# Patient Record
Sex: MALE | Race: BLACK OR AFRICAN AMERICAN | NOT HISPANIC OR LATINO | Employment: FULL TIME | URBAN - METROPOLITAN AREA
[De-identification: names, ages, dates, MRNs, and addresses within clinical notes are randomized per-mention and may not be internally consistent; named-entity substitution may affect disease eponyms.]

---

## 2017-01-16 ENCOUNTER — CLINICAL SUPPORT (OUTPATIENT)
Dept: REHABILITATION | Facility: HOSPITAL | Age: 37
End: 2017-01-16
Attending: ORTHOPAEDIC SURGERY
Payer: COMMERCIAL

## 2017-01-16 DIAGNOSIS — M76.60 ACHILLES TENDON PAIN: Primary | ICD-10-CM

## 2017-01-16 PROCEDURE — 97110 THERAPEUTIC EXERCISES: CPT | Performed by: PHYSICAL THERAPIST

## 2017-01-16 PROCEDURE — 97161 PT EVAL LOW COMPLEX 20 MIN: CPT | Performed by: PHYSICAL THERAPIST

## 2017-01-16 NOTE — PROGRESS NOTES
[facility]   PATIENT EVALUATION    Date: 01/16/2017  Start Time: 5:30  Stop Time: 6:30    Patient Name: Anjelica Vizcaino  Clinic Number: [id number]  Age: 36 y.o.  Gender: male    Diagnosis: OPERATIVE PROCEDURE PERFORMED:  1. Right complex Achilles tendon repair.  2. Right placental graft application  Encounter Diagnosis   Name Primary?    Achilles tendon pain Yes       Referring Physician: Rex Lange*  Treatment Orders: PT Eval and Treat      History     No past medical history on file.    Current Outpatient Prescriptions   Medication Sig    aspirin 325 MG tablet Take 1 tablet (325 mg total) by mouth once daily.    ibuprofen (ADVIL,MOTRIN) 200 MG tablet Take 200 mg by mouth every 6 (six) hours as needed for Pain.    oxycodone-acetaminophen (PERCOCET)  mg per tablet Take 1 tablet by mouth every 4 (four) hours as needed for Pain.    promethazine (PHENERGAN) 25 MG tablet Take 1 tablet (25 mg total) by mouth every 4 (four) hours.     No current facility-administered medications for this visit.        Review of patient's allergies indicates:  No Known Allergies      Subjective     History of Present Condition: Pt reports tearing the right Achilles on Thanksgiving day while playing backyard basketball. Pt reports hearing a pop and got some ice followed by Ibuprofen. Pt followed up with Dr. Bai for surgery.    Onset Date: 11/24/16  Date of Surgery: 12/8/16  Precautions: WEIGHTBEARING STATUS: Nonweightbearing for 6 weeks, partial weightbearing at   6-7 weeks and full weightbearing by 7-8 weeks with fracture walking boot in   place with ambulation at 6-7 and 8 weeks' time.     Following 6 weeks to 8 weeks protection, the patient could be advanced to a   standard Achilles tendon repair protocol, but should avoid high impact type   loads approximately 4-6 months following surgery.    Mechanism of Injury: sudden    Pain Location: right ankle pain  Pain Description:  "Aching, Dull, Burning, Throbbing, Sharp and Shooting  Current Pain: 0/10  Least Pain: 0/10  Worst Pain: 3/10  Aggravating Factors: touch, scraping, boot wear  Relieving Factors:rest      Prior Therapy: none    Occupation: , [employer]      Sports/Recreational Activities: basketball, coaching  Extremity Dominance: R    Prior Level of Function: Independent  Functional Deficits Leading to Referral/Nature of Injury: none  Patient Therapy Goals: To get back to normal function, like it never happened"  Cultural/Environmental/Spiritual Barriers to Treatment or Learning: none      Objective     Observation: pt with open wound at proximal incision - Enrico Walden wound care specialist consulted for appropriate dressing.   Gait: amb into clinic NWB on RLE with boot.      Palpation: TTP along distal gastroc incision.    Range of Motion   Left ankle:  PF: 45  DF: 10  IV: 36  EV: 12    Right ankle:  PF: 20  DF: 5  IV: 15  EV: 10    Joint Mobility: hypomobile right talocrural grossly tested.    Strength   Left ankle:  PF: 5/5  DF: 5/5  IV: 5/5  EV: 5/5    Right ankle:  PF: NT  DF: NT  IV: NT  EV: NT      Special Tests: NT post op        Treatment:   - quad set with strap 5 sec hold x 20  - ABCs x 1   - SLR flexion 3x10  - SL hip abd 3x10        Functional Limitations Reports - G Codes  Category: Mobility  Tool: FOTO  Score: 28       History  Co-morbidities and personal factors that may impact the plan of care Low    Stable Clinical Presentation   Co-morbidities:       Personal Factors:     Body regions    Body systems    Activity Limitations    Participation Restrictons   No personal factors and/ or  comorbidites          Address 1-2 elements:             Assessment     This is a 36 y.o. male referred to outpatient physical therapy and presents with a medical diagnosis of right ankle pain and demonstrates limitations as described in the problem list. Pt demonstrates good rehab potential. Pt will benefit from physcial " therapy services in order to maximize pain free and/or functional use of right LE. The following goals were discussed with the patient and patient is in agreement with them as to be addressed in the treatment plan. Pt was given a HEP consisting of functional ankle ROM therex and hip PREs. Pt verbally understood the instructions as they were given and demonstrated proper form and technique during therapy. Pt was advised to perform these exercises free of pain, and to stop performing them if pain occurs.     Medical necessity is demonstrated by the following problem list:   - Pain limits function of effected part for all activities  - Unable to participate in daily activities   - Requires skilled supervision to complete and progress HEP  - Fall risk - impaired balance   - Continued inability to participate in vocational pursuits          Short Term Goals (9  Weeks):  - Pt will increase ROM of right ankle = left.  - Pt will increase strength of bilateral hip abd = 5/5  - Decrease Pain to 1/10 with ADLs  - Pt to self correct posture independently with normalized gait pattern.   - Pt independent with HEP with progressions.     Long Term Goals (18 Weeks):  - Pt with normalized SLS mechanics x 2 min  - Pt with 94% Ybalance scores  - Decrease Pain to 0/10 with jogging, boxing  - Pt to return to boxing without pain.      Plan     Pt will be treated by physical therapy 2  times a week for 18 weeks for manual therapy, therapeutic exercise, home exercise program, patient education, and modalities PRN to achieve established goals. Anjelica CANTRELL may at times be seen by a PTA as part of the Rehab Team.     Therapist: ALESIA SMALLWOOD, PT    I CERTIFY THE NEED FOR THESE SERVICES FURNISHED UNDER THIS PLAN OF TREATMENT AND WHILE UNDER MY CARE    Physician's comments: ____________________________________________________________________________________________________________________________________________    Physician's Name:  ___________________________________

## 2017-01-18 ENCOUNTER — OFFICE VISIT (OUTPATIENT)
Dept: SPORTS MEDICINE | Facility: CLINIC | Age: 37
End: 2017-01-18
Payer: COMMERCIAL

## 2017-01-18 VITALS
BODY MASS INDEX: 40.18 KG/M2 | HEART RATE: 101 BPM | SYSTOLIC BLOOD PRESSURE: 132 MMHG | HEIGHT: 66 IN | DIASTOLIC BLOOD PRESSURE: 83 MMHG | WEIGHT: 250 LBS

## 2017-01-18 DIAGNOSIS — S81.801A LEG WOUND, RIGHT, INITIAL ENCOUNTER: ICD-10-CM

## 2017-01-18 DIAGNOSIS — T81.30XA WOUND DEHISCENCE: ICD-10-CM

## 2017-01-18 DIAGNOSIS — S86.011D RUPTURE OF RIGHT ACHILLES TENDON, SUBSEQUENT ENCOUNTER: Primary | ICD-10-CM

## 2017-01-18 PROCEDURE — 99024 POSTOP FOLLOW-UP VISIT: CPT | Mod: S$GLB,,, | Performed by: ORTHOPAEDIC SURGERY

## 2017-01-18 PROCEDURE — 87075 CULTR BACTERIA EXCEPT BLOOD: CPT

## 2017-01-18 PROCEDURE — 87070 CULTURE OTHR SPECIMN AEROBIC: CPT

## 2017-01-18 PROCEDURE — 87077 CULTURE AEROBIC IDENTIFY: CPT

## 2017-01-18 PROCEDURE — 87186 SC STD MICRODIL/AGAR DIL: CPT

## 2017-01-18 PROCEDURE — 99999 PR PBB SHADOW E&M-EST. PATIENT-LVL III: CPT | Mod: PBBFAC,,, | Performed by: ORTHOPAEDIC SURGERY

## 2017-01-18 RX ORDER — SULFAMETHOXAZOLE AND TRIMETHOPRIM 800; 160 MG/1; MG/1
1 TABLET ORAL 2 TIMES DAILY
Qty: 30 TABLET | Refills: 0 | Status: SHIPPED | OUTPATIENT
Start: 2017-01-18 | End: 2017-01-28

## 2017-01-18 NOTE — PROGRESS NOTES
Subjective:          Chief Complaint: Anjelica Vizcaino is a 36 y.o. male who had concerns including Post-op Evaluation of the Right Ankle.    HPI Comments: 37 yo male patient s/p below; he is currently in therapy at [facility] with Jamaal Bills ; patient states that the wound opened up a 2.5 weeks ago; he states that is has not been draining       DATE OF PROCEDURE: 12/08/2016     ATTENDING SURGEON: Princess Bai M.D.     FIRST ASSISTANT: Rex Lange MD - fellow.     SECOND ASSISTANT: SMA Alfreda - assistant.     PREOPERATIVE DIAGNOSIS: Right Achilles tendon tear.     POSTOPERATIVE DIAGNOSIS: Right Achilles tendon tear (complex high-grade   interstitial).     OPERATIVE PROCEDURE PERFORMED:  1. Right complex Achilles tendon repair.  2. Right placental graft application. CPT code 09611, reference CPT code   90536.    Handedness: right-handed  Sport played:    Level:            Ankle Pain    This is a new problem. The current episode started in the past 7 days. There has been a history of trauma. Pertinent negatives include no fever, numbness or stiffness. There is no history of gout.   Pain   Pertinent negatives include no chest pain, chills, diaphoresis, fever, joint swelling, myalgias, nausea, numbness, rash, vomiting or weakness.       Review of Systems   Constitution: Negative for chills, decreased appetite, diaphoresis, fever, weakness, malaise/fatigue, night sweats, weight gain and weight loss.   HENT: Negative for hearing loss.    Eyes: Negative for blurred vision, double vision, pain and visual disturbance.   Cardiovascular: Negative for chest pain, cyanosis, dyspnea on exertion, leg swelling, orthopnea and palpitations.   Respiratory: Negative for hemoptysis, shortness of breath and wheezing.    Endocrine: Negative for polyuria.   Hematologic/Lymphatic: Negative for bleeding problem.   Skin: Negative for color change and rash.   Musculoskeletal: Positive for joint pain. Negative for arthritis, back  pain, falls, gout, joint swelling, muscle cramps, muscle weakness, myalgias and stiffness.   Gastrointestinal: Negative for hematemesis, hematochezia, melena, nausea and vomiting.   Genitourinary: Negative for dysuria, frequency and hematuria.   Neurological: Negative for dizziness, light-headedness, loss of balance, numbness and paresthesias.   Psychiatric/Behavioral: Negative for altered mental status, depression and memory loss. The patient does not have insomnia and is not nervous/anxious.        Pain Related Questions  Over the past 3 days, what was your average pain during activity? (I.e. running, jogging, walking, climbing stairs, getting dressed, ect.): 2  Over the past 3 days, what was your highest pain level?: 2  Over the past 3 days, what was your lowest pain level? : 0    Other  How many nights a week are you awakened by your affected body part?: 0  Was the patient's HEIGHT measured or patient reported?: Patient Reported  Was the patient's WEIGHT measured or patient reported?: Patient Reported      Objective:        General: Anjelica CANTRELL is well-developed, well-nourished, appears stated age, in no acute distress, alert and oriented to time, place and person.     General    Vitals reviewed.  Constitutional: He is oriented to person, place, and time. He appears well-developed and well-nourished. No distress.   HENT:   Mouth/Throat: No oropharyngeal exudate.   Eyes: Right eye exhibits no discharge. Left eye exhibits no discharge.   Neck: Normal range of motion.   Cardiovascular: Normal rate.    Pulmonary/Chest: Breath sounds normal. No respiratory distress.   Neurological: He is alert and oriented to person, place, and time. He has normal reflexes. No cranial nerve deficit. Coordination normal.   Psychiatric: He has a normal mood and affect. His behavior is normal. Judgment and thought content normal.     General Musculoskeletal Exam   Gait: normal     Right Ankle/Foot Exam     Inspection   Scars:  present  Deformity: absent  Erythema: absent  Bruising: Ankle - absent Foot - absent  Effusion: Ankle - absent Foot - absent  Atrophy: Ankle - absent Foot - absent    Tenderness   The patient is tender to palpation of the Achilles tendon.    Range of Motion   Ankle Joint   Dorsiflexion:  10 normal   Plantar flexion:  35 normal   Subtalar Joint   Inversion:  15 normal   Eversion:  5 normal   Burrows Test:  positive  First MTP Joint: normal    Alignment   Knee Alignment: neutral  Hindfoot Alignment: neutral  Midfoot Alignment: normal  Forefoot Alignment: normal    Tests   Anterior drawer: 1+  Varus tilt: 1+  Heel Walk: able to perform  Tiptoe Walk: unable to perform  Single Heel Rise: unable to perform  External Rotation Test: negative  Squeeze Test: negative    Other   Ankle Crepitus: absent  Foot Crepitus:  absent  Sensation: normal  Peroneal Subluxation: negative    Comments:  Wound; opening at superior aspect of the wound;     Left Ankle/Foot Exam     Inspection  Deformity: absent  Erythema: absent  Bruising: Ankle - absent Foot - absent  Effusion: Ankle - absent Foot - absent  Atrophy: Ankle - absent Foot - absent  Scars: absent    Range of Motion   Ankle Joint  Dorsiflexion:  10 normal   Plantar flexion:  35 normal     Subtalar Joint   Inversion:  15 normal   Eversion:  5 normal   Burrows Test:  normal  First MTP Joint: normal    Alignment   Knee Alignment: neutral  Hindfoot Alignment: neutral  Midfoot Alignment: normal  Forefoot Alignment: normal    Tests   Anterior drawer: 1+  Varus tilt: 1+  Heel Walk: able to perform  Tiptoe Walk: able to perform  Single Heel Rise: able to perform  External Rotation Test: negative  Squeeze Test: absent    Other   Foot Crepitus:  absent  Ankle Crepitus: absent  Sensation: normal  Peroneal Subluxation: negative      Muscle Strength   Right Lower Extremity   Anterior tibial:  5/5/5  Posterior tibial:  5/5/5  Gastrocsoleus:  4/5/5  Peroneal muscle:  5/5/5  EHL:  5/5  FDL:  5/5  EDL: 5/5  FHL: 5/5  Left Lower Extremity   Anterior tibial:  5/5/5   Posterior tibial:  5/5/5  Gastrocsoleus:  5/5/5  Peroneal muscle:  5/5/5  EHL:  5/5  FDL: 5/5  EDL: 5/5  FHL: 5/5    Reflexes     Left Side  Post Tibial:  2+  Achilles:  2+  Plantar Reflex: 2+    Right Side   Post Tibial:  2+  Achilles:  2+  Plantar Reflex: 2+    Vascular Exam     Right Pulses  Dorsalis Pedis:      2+  Posterior Tibial:      2+        Left Pulses  Dorsalis Pedis:      2+  Posterior Tibial:      2+        Edema  Right Lower Leg: absent  Left Lower Leg: absent      MRI RESULTS  TENDONS: There is severe tendinosis of almost the entire length of the Achilles tendon that spares the distal insertional fibers and is most severe just distal to the myotendinous junction noting a 5 cm segment of significant friability and pronounced peritendinitis with high-grade, near-complete interstitial tearing.  Posterior tibial, flexor hallucis longus, flexor digitorum longus, peroneus longus and peroneus brevis are intact.    LIGAMENTS: There is attenuation of the anterior talofibular ligament suggesting chronic sprain.  Calcaneofibular, posterior talofibular, anterior inferior tibiofibular, posterior inferior tibiofibular, interosseous membrane and Lisfranc ligaments are intact.     MUSCLES:  Normal bulk and signal.  No accessory muscles.    BONES: There is a small os trigonum with mild osseous edema about the synchondrosis and trace fluid within the posterior subtalar recess.  No fractures.  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  No marrow signal abnormality to suggest an infiltrative process.    CARTILAGE:  Tibiotalar, subtalar and mid foot joint spaces are well-maintained.    MISC:  Plantar fascia and sinus tarsi are normal. Tarsal tunnel is  unremarkable.        Assessment:       Encounter Diagnoses   Name Primary?    Rupture of right Achilles tendon, subsequent encounter Yes    Wound dehiscence     Leg wound, right, initial encounter           Plan:       1. Ankle/foot function, SF-12 was not filled out today in clinic.     RTC in 1 weeks. Patient will not fill out Ankle/foot function, SF-12 on return.    2. Enrico Walden wound consult    3. Abx; Bactrim     4. Continue Pt per protocol; may begin to partial weightbearing    5. Applied foam and mepilex dressing

## 2017-01-18 NOTE — PATIENT INSTRUCTIONS
WEIGHTBEARING STATUS: Nonweightbearing for 6 weeks, partial weightbearing at   6-7 weeks and full weightbearing by 7-8 weeks with fracture walking boot in   place with ambulation at 6-7 and 8 weeks' time.     Following 6 weeks to 8 weeks protection, the patient could be advanced to a   standard Achilles tendon repair protocol, but should avoid high impact type   loads approximately 4-6 months following surgery.

## 2017-01-18 NOTE — MR AVS SNAPSHOT
[facility]  [address]  [facility] [address]  Phone: [phone]                  Anjelica Vizcaino   2017 2:15 PM   Office Visit    Description:  Male : 1980   Provider:  Princess Bai MD   Department:  [facility]           Reason for Visit     Right Ankle - Post-op Evaluation           Diagnoses this Visit        Comments    Rupture of right Achilles tendon, subsequent encounter    -  Primary     Wound dehiscence         Leg wound, right, initial encounter                To Do List           Future Appointments        Provider Department Dept Phone    2017 9:30 AM Jamaal Bills, PT [facility] [phone]    2017 4:30 PM Jamaal Bills PT [facility] [phone]    2017 9:30 AM Jamaal Bills PT [facility] [phone]    2017 9:30 AM Jamaal Bills PT [facility] [phone]    2017 9:30 AM Jamaal Bills, PT [facility] [phone]      Goals (5 Years of Data)     None      Follow-Up and Disposition     Return in about 1 week (around 2017), or RTC in 1 weeks. Patient will not fill out Ankle/foot function, SF-12 on return., for RTC in 1 weeks. Patient will not fill out Ankle/foot function, SF-12 on return..       These Medications        Disp Refills Start End    sulfamethoxazole-trimethoprim 800-160mg (BACTRIM DS) 800-160 mg Tab 30 tablet 0 2017    Take 1 tablet by mouth 2 (two) times daily. - Oral    Pharmacy: [facility]/pharmacy #[id number] - [place] [place] [address] #: [phone]         [facility]     [facility] On Call Nurse Care Line -  Assistance  Registered nurses in the [facility] provide clinical advisement, health education, appointment booking, and other advisory services.  Call for this free service at [phone].             Medications          "  Message regarding Medications     Verify the changes and/or additions to your medication regime listed below are the same as discussed with your clinician today.  If any of these changes or additions are incorrect, please notify your healthcare provider.        START taking these NEW medications        Refills    sulfamethoxazole-trimethoprim 800-160mg (BACTRIM DS) 800-160 mg Tab 0    Sig: Take 1 tablet by mouth 2 (two) times daily.    Class: Normal    Route: Oral           Verify that the below list of medications is an accurate representation of the medications you are currently taking.  If none reported, the list may be blank. If incorrect, please contact your healthcare provider. Carry this list with you in case of emergency.           Current Medications     aspirin 325 MG tablet Take 1 tablet (325 mg total) by mouth once daily.    ibuprofen (ADVIL,MOTRIN) 200 MG tablet Take 200 mg by mouth every 6 (six) hours as needed for Pain.    oxycodone-acetaminophen (PERCOCET)  mg per tablet Take 1 tablet by mouth every 4 (four) hours as needed for Pain.    promethazine (PHENERGAN) 25 MG tablet Take 1 tablet (25 mg total) by mouth every 4 (four) hours.    sulfamethoxazole-trimethoprim 800-160mg (BACTRIM DS) 800-160 mg Tab Take 1 tablet by mouth 2 (two) times daily.           Clinical Reference Information           Vital Signs - Last Recorded  Most recent update: 1/18/2017  2:42 PM by Jossy Claudio MA    BP Pulse Ht Wt BMI    132/83 101 5' 6" (1.676 m) 113.4 kg (250 lb) 40.35 kg/m2      Blood Pressure          Most Recent Value    BP  132/83      Allergies as of 1/18/2017     No Known Allergies      Immunizations Administered on Date of Encounter - 1/18/2017     None      Orders Placed During Today's Visit      Normal Orders This Visit    Ambulatory Referral to Physical/Occupational Therapy       Instructions    WEIGHTBEARING STATUS: Nonweightbearing for 6 weeks, partial weightbearing at   6-7 weeks and " full weightbearing by 7-8 weeks with fracture walking boot in   place with ambulation at 6-7 and 8 weeks' time.     Following 6 weeks to 8 weeks protection, the patient could be advanced to a   standard Achilles tendon repair protocol, but should avoid high impact type   loads approximately 4-6 months following surgery.

## 2017-01-23 ENCOUNTER — CLINICAL SUPPORT (OUTPATIENT)
Dept: REHABILITATION | Facility: HOSPITAL | Age: 37
End: 2017-01-23
Attending: ORTHOPAEDIC SURGERY
Payer: COMMERCIAL

## 2017-01-23 DIAGNOSIS — M25.571 ACUTE RIGHT ANKLE PAIN: Primary | ICD-10-CM

## 2017-01-23 LAB
BACTERIA SPEC AEROBE CULT: NORMAL
BACTERIA SPEC ANAEROBE CULT: NORMAL

## 2017-01-23 PROCEDURE — 97140 MANUAL THERAPY 1/> REGIONS: CPT | Performed by: PHYSICAL THERAPIST

## 2017-01-23 PROCEDURE — 97110 THERAPEUTIC EXERCISES: CPT | Performed by: PHYSICAL THERAPIST

## 2017-01-23 NOTE — PROGRESS NOTES
Physical Therapy Daily Note     Date: 01/23/2017  Name: Anjelica Vizcaino  [facility] Number: [id number]  Diagnosis:   Encounter Diagnosis   Name Primary?    Acute right ankle pain Yes     Diagnosis: OPERATIVE PROCEDURE PERFORMED:  1. Right complex Achilles tendon repair.  2. Right placental graft application    Physician: Rex Lange*    Evaluation Date: 1/16/17  Date of Surgery: 12/8/16  Visit #: 2   Start Time:  9:30  Stop Time:  10:30  Total Treatment Time: 60    Precautions: WEIGHTBEARING STATUS: Nonweightbearing for 6 weeks, partial weightbearing at   6-7 weeks and full weightbearing by 7-8 weeks with fracture walking boot in   place with ambulation at 6-7 and 8 weeks' time.      Following 6 weeks to 8 weeks protection, the patient could be advanced to a   standard Achilles tendon repair protocol, but should avoid high impact type   loads approximately 4-6 months following surgery.    Subjective     Pt reports the ankle is feeling better. The wound seems to be healing up. It is moving better.     Pain: 1/10    Objective     Measurements taken: none    Patient received individual therapy to increase strength, endurance and ROM with activities as follows:     Anjelica CANTRELL received therapeutic exercises to develop strength, endurance and ROM for 25 minutes including:   - ankle ABCs x 2 laps  - BAPs board fwd, bwd, side,side x 2 min ea  - towel scrunches x 5 min  - clamshells 3x10  - SLR 3x10   - bridges 3x10  - Shuttle DL 3c 3x10  - Shuttle SL 1c 2x10      Anjelica CANTRELL received the following manual therapy techniques: Joint mobilizations and Soft tissue Mobilization were applied to the: right ankle for 15 minutes.   - PROM: all  - posterior talus mobs      Written Home Exercises Provided: updated as per therex list  Pt demo good understanding of the education provided. Anjelica CANTRELL demonstrated good return demonstration of activities.     Education provided:  Anjelica CANTRELL  verbalized good understanding of education provided.   No spiritual or educational barriers to learning identified.    Assessment     Excellent tolerance to intial PWB therex along with improving right ankle functional mobility. Fatigued with hip progressions. Continue to progress gently as tolerated with PWB as per Dr. Bai.    This is a 36 y.o. male referred to outpatient physical therapy and presents with a medical diagnosis of right ankle pain and demonstrates limitations as described in the problem list Pt prognosis is Good. Pt will continue to benefit from skilled outpatient physical therapy to address the deficits listed below in the problem list, provide pt/family education and to maximize pt's level of independence in the home and community environment.    Will the patient continue to benefit from skilled PT intervention? yes        Medical necessity is demonstrated by:   - Pain limits function of effected part for all activities  - Unable to participate in daily activities   - Requires skilled supervision to complete and progress HEP  - Fall risk - impaired balance   - Continued inability to participate in vocational pursuits    Short Term Goals (9  Weeks):  - Pt will increase ROM of right ankle = left.  - Pt will increase strength of bilateral hip abd = 5/5  - Decrease Pain to 1/10 with ADLs  - Pt to self correct posture independently with normalized gait pattern.   - Pt independent with HEP with progressions.      Long Term Goals (18 Weeks):  - Pt with normalized SLS mechanics x 2 min  - Pt with 94% Ybalance scores  - Decrease Pain to 0/10 with jogging, boxing  - Pt to return to boxing without pain.       Plan   Continue with established Plan of Care towards PT goals.      Therapist: ALESIA SMALLWOOD, PT

## 2017-01-25 ENCOUNTER — OFFICE VISIT (OUTPATIENT)
Dept: SPORTS MEDICINE | Facility: CLINIC | Age: 37
End: 2017-01-25
Payer: COMMERCIAL

## 2017-01-25 ENCOUNTER — CLINICAL SUPPORT (OUTPATIENT)
Dept: REHABILITATION | Facility: HOSPITAL | Age: 37
End: 2017-01-25
Attending: ORTHOPAEDIC SURGERY
Payer: COMMERCIAL

## 2017-01-25 VITALS
HEART RATE: 109 BPM | SYSTOLIC BLOOD PRESSURE: 128 MMHG | WEIGHT: 250 LBS | BODY MASS INDEX: 40.18 KG/M2 | DIASTOLIC BLOOD PRESSURE: 83 MMHG | HEIGHT: 66 IN

## 2017-01-25 DIAGNOSIS — T81.30XA WOUND DEHISCENCE: Primary | ICD-10-CM

## 2017-01-25 DIAGNOSIS — S86.011S ACHILLES TENDON RUPTURE, RIGHT, SEQUELA: ICD-10-CM

## 2017-01-25 DIAGNOSIS — S81.801A LEG WOUND, RIGHT, INITIAL ENCOUNTER: ICD-10-CM

## 2017-01-25 DIAGNOSIS — S86.011D RUPTURE OF RIGHT ACHILLES TENDON, SUBSEQUENT ENCOUNTER: ICD-10-CM

## 2017-01-25 DIAGNOSIS — M25.571 ACUTE RIGHT ANKLE PAIN: Primary | ICD-10-CM

## 2017-01-25 PROCEDURE — 99024 POSTOP FOLLOW-UP VISIT: CPT | Mod: S$GLB,,, | Performed by: ORTHOPAEDIC SURGERY

## 2017-01-25 PROCEDURE — 97140 MANUAL THERAPY 1/> REGIONS: CPT | Performed by: PHYSICAL THERAPIST

## 2017-01-25 PROCEDURE — 99999 PR PBB SHADOW E&M-EST. PATIENT-LVL III: CPT | Mod: PBBFAC,,, | Performed by: ORTHOPAEDIC SURGERY

## 2017-01-25 PROCEDURE — 97110 THERAPEUTIC EXERCISES: CPT | Performed by: PHYSICAL THERAPIST

## 2017-01-25 RX ORDER — SULFAMETHOXAZOLE AND TRIMETHOPRIM 800; 160 MG/1; MG/1
1 TABLET ORAL 2 TIMES DAILY
Qty: 30 TABLET | Refills: 0 | Status: CANCELLED | OUTPATIENT
Start: 2017-01-25 | End: 2017-02-04

## 2017-01-25 NOTE — MR AVS SNAPSHOT
[facility]  [address]  [facility] [address]  Phone: [phone]                  Anjelica Vizcaino   2017 2:30 PM   Office Visit    Description:  Male : 1980   Provider:  Princess Bai MD   Department:  [facility]           Reason for Visit     Right Ankle - Post-op Evaluation           Diagnoses this Visit        Comments    Wound dehiscence    -  Primary     Rupture of right Achilles tendon, subsequent encounter         Leg wound, right, initial encounter         BMI 39.0-39.9,adult         Achilles tendon rupture, right, sequela                To Do List           Future Appointments        Provider Department Dept Phone    2017 4:30 PM Jamaal Bills, PT [facility] [phone]    2017 9:30 AM Jamaal Bills PT [facility] [phone]    2017 9:30 AM Jamaal Bills PT [facility] [phone]    2017 9:30 AM Jamaal Bills PT [facility] [phone]    2017 9:30 AM Jamaal Bills, PT [facility] [phone]      Goals (5 Years of Data)     None      Follow-Up and Disposition     Return in about 1 week (around 2017) for RTC in 1 weeks with Ruthy Lynn PA-C. Patient will not fill out Ankle/foot function, SF-12 on re.      [facility]     [facility] -  Assistance  Registered nurses in the [facility] provide clinical advisement, health education, appointment booking, and other advisory services.  Call for this free service at [phone].             Medications           Message regarding Medications     Verify the changes and/or additions to your medication regime listed below are the same as discussed with your clinician today.  If any of these changes or additions are incorrect, please notify your healthcare provider.             Verify that the below list of  "medications is an accurate representation of the medications you are currently taking.  If none reported, the list may be blank. If incorrect, please contact your healthcare provider. Carry this list with you in case of emergency.           Current Medications     aspirin 325 MG tablet Take 1 tablet (325 mg total) by mouth once daily.    ibuprofen (ADVIL,MOTRIN) 200 MG tablet Take 200 mg by mouth every 6 (six) hours as needed for Pain.    oxycodone-acetaminophen (PERCOCET)  mg per tablet Take 1 tablet by mouth every 4 (four) hours as needed for Pain.    promethazine (PHENERGAN) 25 MG tablet Take 1 tablet (25 mg total) by mouth every 4 (four) hours.    sulfamethoxazole-trimethoprim 800-160mg (BACTRIM DS) 800-160 mg Tab Take 1 tablet by mouth 2 (two) times daily.           Clinical Reference Information           Vital Signs - Last Recorded  Most recent update: 1/25/2017  2:27 PM by Nya Farrell MA    BP Pulse Ht Wt BMI    128/83 109 5' 6" (1.676 m) 113.4 kg (250 lb) 40.35 kg/m2      Blood Pressure          Most Recent Value    BP  128/83      Allergies as of 1/25/2017     No Known Allergies      Immunizations Administered on Date of Encounter - 1/25/2017     None      Instructions    CAST CHANGE: The patient should be scheduled for a cast change to occur 8 days   following surgery, switching out of the postoperative splint and into a   plantarflexion cast (short-leg). Cast will maintain for an additional 2 weeks   for a total of 3 weeks postoperatively. The patient will then taken out of the   cast and placed to a fracture walker boot with 2 heel cups at that time (3   weeks). The patient was maintained in the fracture walker boot for an   additional 3 weeks.     WEIGHTBEARING STATUS: Nonweightbearing for 6 weeks, partial weightbearing at   6-7 weeks and full weightbearing by 7-8 weeks with fracture walking boot in   place with ambulation at 6-7 and 8 weeks' time.     Following 6 weeks to 8 weeks " protection, the patient could be advanced to a   standard Achilles tendon repair protocol, but should avoid high impact type   loads approximately 4-6 months following surgery.

## 2017-01-25 NOTE — PATIENT INSTRUCTIONS
CAST CHANGE: The patient should be scheduled for a cast change to occur 8 days   following surgery, switching out of the postoperative splint and into a   plantarflexion cast (short-leg). Cast will maintain for an additional 2 weeks   for a total of 3 weeks postoperatively. The patient will then taken out of the   cast and placed to a fracture walker boot with 2 heel cups at that time (3   weeks). The patient was maintained in the fracture walker boot for an   additional 3 weeks.     WEIGHTBEARING STATUS: Nonweightbearing for 6 weeks, partial weightbearing at   6-7 weeks and full weightbearing by 7-8 weeks with fracture walking boot in   place with ambulation at 6-7 and 8 weeks' time.     Following 6 weeks to 8 weeks protection, the patient could be advanced to a   standard Achilles tendon repair protocol, but should avoid high impact type   loads approximately 4-6 months following surgery.

## 2017-01-25 NOTE — PROGRESS NOTES
Subjective:          Chief Complaint: Anjelica Vizcaino is a 36 y.o. male who had concerns including Post-op Evaluation of the Right Ankle.    HPI Comments: 37 yo male patient s/p below; he is currently in therapy at [facility] with Jamaal Bills ; Patient saw Enrico Walden on Monday; Pt is here for wound check       DATE OF PROCEDURE: 12/08/2016     ATTENDING SURGEON: Princess Bai M.D.     FIRST ASSISTANT: Rex Lange MD - fellow.     SECOND ASSISTANT: SMA Alfreda - assistant.     PREOPERATIVE DIAGNOSIS: Right Achilles tendon tear.     POSTOPERATIVE DIAGNOSIS: Right Achilles tendon tear (complex high-grade   interstitial).     OPERATIVE PROCEDURE PERFORMED:  1. Right complex Achilles tendon repair.  2. Right placental graft application. CPT code 40603, reference CPT code   32695.    Handedness: right-handed  Sport played:    Level:            Ankle Pain    This is a new problem. The current episode started in the past 7 days. There has been a history of trauma. Pertinent negatives include no fever, numbness or stiffness. There is no history of gout.   Pain   Pertinent negatives include no chest pain, chills, diaphoresis, fever, joint swelling, myalgias, nausea, numbness, rash, vomiting or weakness.       Review of Systems   Constitution: Negative for chills, decreased appetite, diaphoresis, fever, weakness, malaise/fatigue, night sweats, weight gain and weight loss.   HENT: Negative for hearing loss.    Eyes: Negative for blurred vision, double vision, pain and visual disturbance.   Cardiovascular: Negative for chest pain, cyanosis, dyspnea on exertion, leg swelling, orthopnea and palpitations.   Respiratory: Negative for hemoptysis, shortness of breath and wheezing.    Endocrine: Negative for polyuria.   Hematologic/Lymphatic: Negative for bleeding problem.   Skin: Negative for color change and rash.   Musculoskeletal: Positive for joint pain. Negative for arthritis, back pain, falls, gout, joint  swelling, muscle cramps, muscle weakness, myalgias and stiffness.   Gastrointestinal: Negative for hematemesis, hematochezia, melena, nausea and vomiting.   Genitourinary: Negative for dysuria, frequency and hematuria.   Neurological: Negative for dizziness, light-headedness, loss of balance, numbness and paresthesias.   Psychiatric/Behavioral: Negative for altered mental status, depression and memory loss. The patient does not have insomnia and is not nervous/anxious.        Pain Related Questions  Over the past 3 days, what was your average pain during activity? (I.e. running, jogging, walking, climbing stairs, getting dressed, ect.): 0  Over the past 3 days, what was your highest pain level?: 0  Over the past 3 days, what was your lowest pain level? : 0    Other  How many nights a week are you awakened by your affected body part?: 0  Was the patient's HEIGHT measured or patient reported?: Patient Reported  Was the patient's WEIGHT measured or patient reported?: Measured      Objective:        General: Anjelica is well-developed, well-nourished, appears stated age, in no acute distress, alert and oriented to time, place and person.     General    Vitals reviewed.  Constitutional: He is oriented to person, place, and time. He appears well-developed and well-nourished. No distress.   HENT:   Mouth/Throat: No oropharyngeal exudate.   Eyes: Right eye exhibits no discharge. Left eye exhibits no discharge.   Neck: Normal range of motion.   Cardiovascular: Normal rate.    Pulmonary/Chest: Breath sounds normal. No respiratory distress.   Neurological: He is alert and oriented to person, place, and time. He has normal reflexes. No cranial nerve deficit. Coordination normal.   Psychiatric: He has a normal mood and affect. His behavior is normal. Judgment and thought content normal.     General Musculoskeletal Exam   Gait: normal     Right Ankle/Foot Exam     Inspection   Scars: present  Deformity: absent  Erythema:  absent  Bruising: Ankle - absent Foot - absent  Effusion: Ankle - absent Foot - absent  Atrophy: Ankle - absent Foot - absent    Tenderness   The patient is tender to palpation of the Achilles tendon.    Range of Motion   Ankle Joint   Dorsiflexion:  10 normal   Plantar flexion:  35 normal   Subtalar Joint   Inversion:  15 normal   Eversion:  5 normal   Burrows Test:  positive  First MTP Joint: normal    Alignment   Knee Alignment: neutral  Hindfoot Alignment: neutral  Midfoot Alignment: normal  Forefoot Alignment: normal    Tests   Anterior drawer: 1+  Varus tilt: 1+  Heel Walk: able to perform  Tiptoe Walk: unable to perform  Single Heel Rise: unable to perform  External Rotation Test: negative  Squeeze Test: negative    Other   Ankle Crepitus: absent  Foot Crepitus:  absent  Sensation: normal  Peroneal Subluxation: negative    Comments:  Wound; opening at superior aspect of the wound;  Wound improving     Left Ankle/Foot Exam     Inspection  Deformity: absent  Erythema: absent  Bruising: Ankle - absent Foot - absent  Effusion: Ankle - absent Foot - absent  Atrophy: Ankle - absent Foot - absent  Scars: absent    Range of Motion   Ankle Joint  Dorsiflexion:  10 normal   Plantar flexion:  35 normal     Subtalar Joint   Inversion:  15 normal   Eversion:  5 normal   Burrows Test:  normal  First MTP Joint: normal    Alignment   Knee Alignment: neutral  Hindfoot Alignment: neutral  Midfoot Alignment: normal  Forefoot Alignment: normal    Tests   Anterior drawer: 1+  Varus tilt: 1+  Heel Walk: able to perform  Tiptoe Walk: able to perform  Single Heel Rise: able to perform  External Rotation Test: negative  Squeeze Test: absent    Other   Foot Crepitus:  absent  Ankle Crepitus: absent  Sensation: normal  Peroneal Subluxation: negative      Muscle Strength   Right Lower Extremity   Anterior tibial:  5/5/5  Posterior tibial:  5/5/5  Gastrocsoleus:  4/5/5  Peroneal muscle:  5/5/5  EHL:  5/5  FDL: 5/5  EDL: 5/5  FHL:  5/5  Left Lower Extremity   Anterior tibial:  5/5/5   Posterior tibial:  5/5/5  Gastrocsoleus:  5/5/5  Peroneal muscle:  5/5/5  EHL:  5/5  FDL: 5/5  EDL: 5/5  FHL: 5/5    Reflexes     Left Side  Post Tibial:  2+  Achilles:  2+  Plantar Reflex: 2+    Right Side   Post Tibial:  2+  Achilles:  2+  Plantar Reflex: 2+    Vascular Exam     Right Pulses  Dorsalis Pedis:      2+  Posterior Tibial:      2+        Left Pulses  Dorsalis Pedis:      2+  Posterior Tibial:      2+        Edema  Right Lower Leg: absent  Left Lower Leg: absent              Assessment:       Encounter Diagnoses   Name Primary?    Wound dehiscence Yes    Rupture of right Achilles tendon, subsequent encounter     Leg wound, right, initial encounter     BMI 39.0-39.9,adult     Achilles tendon rupture, right, sequela           Plan:       1. Ankle/foot function, SF-12 was not filled out today in clinic.     RTC in 1 weeks with Ruthy Lynn PA-C. Patient will not fill out Ankle/foot function, SF-12 on return.    2. Continue antibiotics     3. Continue PT and progress per protocol    4. Continue following up Enrico Walden       5. WEIGHTBEARING STATUS: Nonweightbearing for 6 weeks, partial weightbearing at   6-7 weeks and full weightbearing by 7-8 weeks with fracture walking boot in   place with ambulation at 6-7 and 8 weeks' time.     Following 6 weeks to 8 weeks protection, the patient could be advanced to a   standard Achilles tendon repair protocol, but should avoid high impact type   loads approximately 4-6 months following surgery.

## 2017-01-26 NOTE — PROGRESS NOTES
Physical Therapy Daily Note     Date: 01/25/2017  Name: Anjelica Vizcaino  Clinic Number: [id number]  Diagnosis:   Encounter Diagnosis   Name Primary?    Acute right ankle pain Yes     Diagnosis: OPERATIVE PROCEDURE PERFORMED:  1. Right complex Achilles tendon repair.  2. Right placental graft application    Physician: Rex Lange*    Evaluation Date: 1/16/17  Date of Surgery: 12/8/16  Visit #: 3  Start Time:  4:30  Stop Time:  5:30  Total Treatment Time: 60    Precautions: WEIGHTBEARING STATUS: Nonweightbearing for 6 weeks, partial weightbearing at   6-7 weeks and full weightbearing by 7-8 weeks with fracture walking boot in   place with ambulation at 6-7 and 8 weeks' time.      Following 6 weeks to 8 weeks protection, the patient could be advanced to a   standard Achilles tendon repair protocol, but should avoid high impact type   loads approximately 4-6 months following surgery.    Subjective     Pt reports the ankle is getting better. Dr. Bai said it is looking good and the wound is healing well.     Pain: 1/10    Objective     Measurements taken: none    Patient received individual therapy to increase strength, endurance and ROM with activities as follows:     Anjelica received therapeutic exercises to develop strength, endurance and ROM for 25 minutes including:   - ankle ABCs x 2 laps  - BAPs board fwd, bwd, side,side x 2 min ea  - towel scrunches x 5 min  - clamshells 3x10  - SLR 3x10   - SL hip abd 3x10  - bridges 3x10  - seated calf/heel raises x 30  np Shuttle DL 3c 3x10  np Shuttle SL 1c 2x10  - Bike x 10 min  - ankle 4 way green 3x10      Anjelica received the following manual therapy techniques: Joint mobilizations and Soft tissue Mobilization were applied to the: right ankle for 15 minutes.   - PROM: all  - posterior talus mobs      Written Home Exercises Provided: updated as per therex list  Pt demo good understanding of the education provided.  Anjelica demonstrated good return demonstration of activities.     Education provided:  Anjelica verbalized good understanding of education provided.   No spiritual or educational barriers to learning identified.    Assessment     Improving functional hip stability and global right ankle strength. Right ankle mobility continues to greatly improve as well.     This is a 36 y.o. male referred to outpatient physical therapy and presents with a medical diagnosis of right ankle pain and demonstrates limitations as described in the problem list Pt prognosis is Good. Pt will continue to benefit from skilled outpatient physical therapy to address the deficits listed below in the problem list, provide pt/family education and to maximize pt's level of independence in the home and community environment.    Will the patient continue to benefit from skilled PT intervention? yes        Medical necessity is demonstrated by:   - Pain limits function of effected part for all activities  - Unable to participate in daily activities   - Requires skilled supervision to complete and progress HEP  - Fall risk - impaired balance   - Continued inability to participate in vocational pursuits    Short Term Goals (9  Weeks):  - Pt will increase ROM of right ankle = left.  - Pt will increase strength of bilateral hip abd = 5/5  - Decrease Pain to 1/10 with ADLs  - Pt to self correct posture independently with normalized gait pattern.   - Pt independent with HEP with progressions.      Long Term Goals (18 Weeks):  - Pt with normalized SLS mechanics x 2 min  - Pt with 94% Ybalance scores  - Decrease Pain to 0/10 with jogging, boxing  - Pt to return to boxing without pain.       Plan   Continue with established Plan of Care towards PT goals.      Therapist: ALESIA SMALLWOOD, PT

## 2017-01-30 ENCOUNTER — CLINICAL SUPPORT (OUTPATIENT)
Dept: REHABILITATION | Facility: HOSPITAL | Age: 37
End: 2017-01-30
Attending: ORTHOPAEDIC SURGERY
Payer: COMMERCIAL

## 2017-01-30 DIAGNOSIS — M25.571 ACUTE RIGHT ANKLE PAIN: Primary | ICD-10-CM

## 2017-01-30 PROCEDURE — 97110 THERAPEUTIC EXERCISES: CPT | Performed by: PHYSICAL THERAPIST

## 2017-01-30 PROCEDURE — 97140 MANUAL THERAPY 1/> REGIONS: CPT | Performed by: PHYSICAL THERAPIST

## 2017-01-30 NOTE — PROGRESS NOTES
Physical Therapy Daily Note     Date: 01/30/2017  Name: Anjelica Vizcaino  Clinic Number: [id number]  Diagnosis:   Encounter Diagnosis   Name Primary?    Acute right ankle pain Yes     Diagnosis: OPERATIVE PROCEDURE PERFORMED:  1. Right complex Achilles tendon repair.  2. Right placental graft application    Physician: Rex Lange*    Evaluation Date: 1/16/17  Date of Surgery: 12/8/16  Visit #: 4  Start Time:  9:30  Stop Time:  10:30  Total Treatment Time: 60    Precautions: WEIGHTBEARING STATUS: Nonweightbearing for 6 weeks, partial weightbearing at   6-7 weeks and full weightbearing by 7-8 weeks with fracture walking boot in   place with ambulation at 6-7 and 8 weeks' time.      Following 6 weeks to 8 weeks protection, the patient could be advanced to a   standard Achilles tendon repair protocol, but should avoid high impact type   loads approximately 4-6 months following surgery.    Subjective     Pt reports the ankle is feeling good..     Pain: 1/10    Objective     Measurements taken: none    Patient received individual therapy to increase strength, endurance and ROM with activities as follows:     Anjelica received therapeutic exercises to develop strength, endurance and ROM for 25 minutes including:   - ankle ABCs x 2 laps  - BAPs board fwd, bwd, side,side x 2 min ea  - towel scrunches x 5 min  - clamshells 3x10  - SLR 3x10   - SL hip abd 3x10  - bridges 3x10  - seated calf/heel raises x 30  np Shuttle DL 3c 3x10  np Shuttle SL 1c 2x10  - Bike x 10 min  - ankle 4 way green 3x10  - SL bridges well leg 3x10      Anjelica received the following manual therapy techniques: Joint mobilizations and Soft tissue Mobilization were applied to the: right ankle for 15 minutes.   - PROM: all  - posterior talus mobs      Written Home Exercises Provided: updated as per therex list  Pt demo good understanding of the education provided. Anjelica demonstrated good return  demonstration of activities.     Education provided:  Anjelica verbalized good understanding of education provided.   No spiritual or educational barriers to learning identified.    Assessment     Improving ankle mobility and strength along with functional hip strength. Continue to progress as tolerated.     This is a 36 y.o. male referred to outpatient physical therapy and presents with a medical diagnosis of right ankle pain and demonstrates limitations as described in the problem list Pt prognosis is Good. Pt will continue to benefit from skilled outpatient physical therapy to address the deficits listed below in the problem list, provide pt/family education and to maximize pt's level of independence in the home and community environment.    Will the patient continue to benefit from skilled PT intervention? yes        Medical necessity is demonstrated by:   - Pain limits function of effected part for all activities  - Unable to participate in daily activities   - Requires skilled supervision to complete and progress HEP  - Fall risk - impaired balance   - Continued inability to participate in vocational pursuits    Short Term Goals (9  Weeks):  - Pt will increase ROM of right ankle = left.  - Pt will increase strength of bilateral hip abd = 5/5  - Decrease Pain to 1/10 with ADLs  - Pt to self correct posture independently with normalized gait pattern.   - Pt independent with HEP with progressions.      Long Term Goals (18 Weeks):  - Pt with normalized SLS mechanics x 2 min  - Pt with 94% Ybalance scores  - Decrease Pain to 0/10 with jogging, boxing  - Pt to return to boxing without pain.       Plan   Continue with established Plan of Care towards PT goals.      Therapist: ALESIA SMALLWOOD, PT

## 2017-02-02 ENCOUNTER — OFFICE VISIT (OUTPATIENT)
Dept: SPORTS MEDICINE | Facility: CLINIC | Age: 37
End: 2017-02-02
Payer: COMMERCIAL

## 2017-02-02 VITALS
BODY MASS INDEX: 40.18 KG/M2 | WEIGHT: 250 LBS | HEART RATE: 97 BPM | DIASTOLIC BLOOD PRESSURE: 86 MMHG | SYSTOLIC BLOOD PRESSURE: 130 MMHG | HEIGHT: 66 IN

## 2017-02-02 DIAGNOSIS — T81.30XA WOUND DEHISCENCE: Primary | ICD-10-CM

## 2017-02-02 PROCEDURE — 99999 PR PBB SHADOW E&M-EST. PATIENT-LVL III: CPT | Mod: PBBFAC,,, | Performed by: PHYSICIAN ASSISTANT

## 2017-02-02 PROCEDURE — 99024 POSTOP FOLLOW-UP VISIT: CPT | Mod: S$GLB,,, | Performed by: PHYSICIAN ASSISTANT

## 2017-02-02 NOTE — MR AVS SNAPSHOT
[facility]  [address]  [facility] [address]  Phone: [phone]                  Anjelica Vizcaino   2017 4:30 PM   Appointment    Description:  Male : 1980   Provider:  Ruthy Lynn PA-C   Department:  [facility]                To Do List           Future Appointments        Provider Department Dept Phone    2017 4:30 PM Ruthy Lynn PA-C [facility] [phone]    2017 9:30 AM Jamaal Bills, PT [facility] [phone]    2017 9:30 AM Jamaal Bills PT [facility] [phone]    2017 9:30 AM Jamaal Bills PT [facility] [phone]    2/15/2017 9:30 AM Jamaal Bills, PT [facility] [phone]      Goals (5 Years of Data)     None      [facility]     [facility] Care [facility] -  Assistance  Registered nurses in the [facility] provide clinical advisement, health education, appointment booking, and other advisory services.  Call for this free service at [phone].             Medications           Message regarding Medications     Verify the changes and/or additions to your medication regime listed below are the same as discussed with your clinician today.  If any of these changes or additions are incorrect, please notify your healthcare provider.             Verify that the below list of medications is an accurate representation of the medications you are currently taking.  If none reported, the list may be blank. If incorrect, please contact your healthcare provider. Carry this list with you in case of emergency.           Current Medications     aspirin 325 MG tablet Take 1 tablet (325 mg total) by mouth once daily.    ibuprofen (ADVIL,MOTRIN) 200 MG tablet Take 200 mg by mouth every 6 (six) hours as needed for Pain.    oxycodone-acetaminophen (PERCOCET)  mg per tablet Take 1 tablet  by mouth every 4 (four) hours as needed for Pain.    promethazine (PHENERGAN) 25 MG tablet Take 1 tablet (25 mg total) by mouth every 4 (four) hours.           Clinical Reference Information           Allergies as of 2/2/2017     No Known Allergies      Immunizations Administered on Date of Encounter - 2/2/2017     None

## 2017-02-03 NOTE — PROGRESS NOTES
Subjective:          Chief Complaint: Anjelica Vizcaino is a 36 y.o. male who had concerns including Post-op Evaluation of the Right Ankle.    HPI Comments: 37 yo male patient s/p below; he is currently in therapy at [facility] with Jamaal Bills. Pt is here for wound check. He is being following by Enrico Walden for his wound. He is still non weight bearing due to dehiscense of wound.      DATE OF PROCEDURE: 12/08/2016     ATTENDING SURGEON: Princess Bai M.D.     FIRST ASSISTANT: Rex Lange MD - fellow.     SECOND ASSISTANT: SMA Alfread - assistant.     PREOPERATIVE DIAGNOSIS: Right Achilles tendon tear.     POSTOPERATIVE DIAGNOSIS: Right Achilles tendon tear (complex high-grade   interstitial).     OPERATIVE PROCEDURE PERFORMED:  1. Right complex Achilles tendon repair.  2. Right placental graft application. CPT code 44740, reference CPT code   04460.    Handedness: right-handed  Sport played:    Level:            Ankle Pain    This is a new problem. The current episode started in the past 7 days. There has been a history of trauma. Pertinent negatives include no fever, numbness or stiffness. There is no history of gout.   Pain   Pertinent negatives include no chest pain, chills, diaphoresis, fever, joint swelling, myalgias, nausea, numbness, rash, vomiting or weakness.       Review of Systems   Constitution: Negative for chills, decreased appetite, diaphoresis, fever, weakness, malaise/fatigue, night sweats, weight gain and weight loss.   HENT: Negative for hearing loss.    Eyes: Negative for blurred vision, double vision, pain and visual disturbance.   Cardiovascular: Negative for chest pain, cyanosis, dyspnea on exertion, leg swelling, orthopnea and palpitations.   Respiratory: Negative for hemoptysis, shortness of breath and wheezing.    Endocrine: Negative for polyuria.   Hematologic/Lymphatic: Negative for bleeding problem.   Skin: Negative for color change and rash.   Musculoskeletal: Positive for  joint pain. Negative for arthritis, back pain, falls, gout, joint swelling, muscle cramps, muscle weakness, myalgias and stiffness.   Gastrointestinal: Negative for hematemesis, hematochezia, melena, nausea and vomiting.   Genitourinary: Negative for dysuria, frequency and hematuria.   Neurological: Negative for dizziness, light-headedness, loss of balance, numbness and paresthesias.   Psychiatric/Behavioral: Negative for altered mental status, depression and memory loss. The patient does not have insomnia and is not nervous/anxious.        Pain Related Questions  Over the past 3 days, what was your average pain during activity? (I.e. running, jogging, walking, climbing stairs, getting dressed, ect.): 0  Over the past 3 days, what was your highest pain level?: 0  Over the past 3 days, what was your lowest pain level? : 0    Other  How many nights a week are you awakened by your affected body part?: 0  Was the patient's HEIGHT measured or patient reported?: Patient Reported  Was the patient's WEIGHT measured or patient reported?: Measured      Objective:        General: Anjelica is well-developed, well-nourished, appears stated age, in no acute distress, alert and oriented to time, place and person.     General    Vitals reviewed.  Constitutional: He is oriented to person, place, and time. He appears well-developed and well-nourished. No distress.   HENT:   Mouth/Throat: No oropharyngeal exudate.   Eyes: Right eye exhibits no discharge. Left eye exhibits no discharge.   Neck: Normal range of motion.   Cardiovascular: Normal rate.    Pulmonary/Chest: Breath sounds normal. No respiratory distress.   Neurological: He is alert and oriented to person, place, and time. He has normal reflexes. No cranial nerve deficit. Coordination normal.   Psychiatric: He has a normal mood and affect. His behavior is normal. Judgment and thought content normal.     General Musculoskeletal Exam   Gait: normal     Right Ankle/Foot Exam      Inspection   Scars: present  Deformity: absent  Erythema: absent  Bruising: Ankle - absent Foot - absent  Effusion: Ankle - absent Foot - absent  Atrophy: Ankle - absent Foot - absent    Tenderness   The patient is tender to palpation of the Achilles tendon.    Range of Motion   Ankle Joint   Dorsiflexion:  10 normal   Plantar flexion:  35 normal   Subtalar Joint   Inversion:  15 normal   Eversion:  5 normal   Burrows Test:  positive  First MTP Joint: normal    Alignment   Knee Alignment: neutral  Hindfoot Alignment: neutral  Midfoot Alignment: normal  Forefoot Alignment: normal    Tests   Anterior drawer: 1+  Varus tilt: 1+  Heel Walk: able to perform  Tiptoe Walk: unable to perform  Single Heel Rise: unable to perform  External Rotation Test: negative  Squeeze Test: negative    Other   Ankle Crepitus: absent  Foot Crepitus:  absent  Sensation: normal  Peroneal Subluxation: negative    Comments:  Wound; opening at superior aspect of the wound; 0.5cm x 0.8cm. Wound improving. No signs of infection. No purulent drainage    Left Ankle/Foot Exam     Inspection  Deformity: absent  Erythema: absent  Bruising: Ankle - absent Foot - absent  Effusion: Ankle - absent Foot - absent  Atrophy: Ankle - absent Foot - absent  Scars: absent    Range of Motion   Ankle Joint  Dorsiflexion:  10 normal   Plantar flexion:  35 normal     Subtalar Joint   Inversion:  15 normal   Eversion:  5 normal   Burrows Test:  normal  First MTP Joint: normal    Alignment   Knee Alignment: neutral  Hindfoot Alignment: neutral  Midfoot Alignment: normal  Forefoot Alignment: normal    Tests   Anterior drawer: 1+  Varus tilt: 1+  Heel Walk: able to perform  Tiptoe Walk: able to perform  Single Heel Rise: able to perform  External Rotation Test: negative  Squeeze Test: absent    Other   Foot Crepitus:  absent  Ankle Crepitus: absent  Sensation: normal  Peroneal Subluxation: negative      Muscle Strength   Right Lower Extremity   Anterior tibial:   5/5/5  Posterior tibial:  5/5/5  Gastrocsoleus:  4/5/5  Peroneal muscle:  5/5/5  EHL:  5/5  FDL: 5/5  EDL: 5/5  FHL: 5/5  Left Lower Extremity   Anterior tibial:  5/5/5   Posterior tibial:  5/5/5  Gastrocsoleus:  5/5/5  Peroneal muscle:  5/5/5  EHL:  5/5  FDL: 5/5  EDL: 5/5  FHL: 5/5    Reflexes     Left Side  Post Tibial:  2+  Achilles:  2+  Plantar Reflex: 2+    Right Side   Post Tibial:  2+  Achilles:  2+  Plantar Reflex: 2+    Vascular Exam     Right Pulses  Dorsalis Pedis:      2+  Posterior Tibial:      2+        Left Pulses  Dorsalis Pedis:      2+  Posterior Tibial:      2+        Edema  Right Lower Leg: absent  Left Lower Leg: absent              Assessment:       Encounter Diagnosis   Name Primary?    Wound dehiscence Yes          Plan:       1. Ankle/foot function, SF-12 was not filled out today in clinic.     RTC in 1 weeks with Ruthy Lynn PA-C for wound check. Patient will not fill out Ankle/foot function, SF-12 on return.    2. Continue antibiotics     3. Continue PT and progress per protocol    4. Continue following up Enrico Walden     5. WEIGHTBEARING STATUS: Nonweightbearing for 6 weeks, partial weightbearing at   6-7 weeks and full weightbearing by 7-8 weeks with fracture walking boot in   place with ambulation at 6-7 and 8 weeks' time.     Following 6 weeks to 8 weeks protection, the patient could be advanced to a   standard Achilles tendon repair protocol, but should avoid high impact type   loads approximately 4-6 months following surgery.

## 2017-02-06 ENCOUNTER — CLINICAL SUPPORT (OUTPATIENT)
Dept: REHABILITATION | Facility: HOSPITAL | Age: 37
End: 2017-02-06
Attending: ORTHOPAEDIC SURGERY
Payer: COMMERCIAL

## 2017-02-06 DIAGNOSIS — M25.571 ACUTE RIGHT ANKLE PAIN: Primary | ICD-10-CM

## 2017-02-06 PROCEDURE — 97110 THERAPEUTIC EXERCISES: CPT

## 2017-02-06 NOTE — PROGRESS NOTES
Physical Therapy Daily Note     Date: 02/06/2017  Name: Anjelica Vizcaino  Clinic Number: [id number]  Diagnosis:   No diagnosis found.  Diagnosis: OPERATIVE PROCEDURE PERFORMED:  1. Right complex Achilles tendon repair.  2. Right placental graft application    Physician: Rex Lange*    Evaluation Date: 1/16/17  Date of Surgery: 12/8/16  Visit #: 5  Start Time:  9:30  Stop Time:  10:35  Total Treatment Time: 65    Precautions: WEIGHTBEARING STATUS: Nonweightbearing for 6 weeks, partial weightbearing at   6-7 weeks and full weightbearing by 7-8 weeks with fracture walking boot in   place with ambulation at 6-7 and 8 weeks' time.      Following 6 weeks to 8 weeks protection, the patient could be advanced to a   standard Achilles tendon repair protocol, but should avoid high impact type   loads approximately 4-6 months following surgery.    Subjective     Pt states feeling well w/ no c/o pn in R ankle.      Pain: 0/10    Objective     Measurements taken: none  FOTO: 35% 2/6/17  Patient received individual therapy to increase strength, endurance and ROM with activities as follows:     Anjelica received therapeutic exercises to develop strength, endurance and ROM for 38 minutes including:     - ankle ABCs x 2   - BAPs board fwd, bwd, side,side x 2 min ea  - towel scrunches x 5 min  - clamshells 30 x  - SLR 30 x  - SL hip abd 3x10  - bridges 3x10  - seated calf/heel raises x 30  np Shuttle DL 3c 3x10  np Shuttle SL 1c 2x10  - Bike x 10 min   WT shift in boot as severiano 30 x w/ 3 sex hold  - ankle 4 way BTB 3x10  - SL bridges well leg 3x10      Anjelica received the following manual therapy techniques: Joint mobilizations and Soft tissue Mobilization were applied to the: right ankle for 15 minutes. NP  - PROM: all  - posterior talus mobs    Pt received wound care from wound care specialist PT.     Written Home Exercises Provided: updated as per therex list  Pt demo good  understanding of the education provided. Anjelica demonstrated good return demonstration of activities.     Education provided:  Anjelica verbalized good understanding of education provided.   No spiritual or educational barriers to learning identified.    Assessment     Pt severiano tx well w/ no c/o pn.  Pt displayed increased ankle strength during therex w/ VCs for technique.  Pt instructed to cont HEP.  Cont to progress as severiano.      This is a 36 y.o. male referred to outpatient physical therapy and presents with a medical diagnosis of right ankle pain and demonstrates limitations as described in the problem list Pt prognosis is Good. Pt will continue to benefit from skilled outpatient physical therapy to address the deficits listed below in the problem list, provide pt/family education and to maximize pt's level of independence in the home and community environment.    Will the patient continue to benefit from skilled PT intervention? yes        Medical necessity is demonstrated by:   - Pain limits function of effected part for all activities  - Unable to participate in daily activities   - Requires skilled supervision to complete and progress HEP  - Fall risk - impaired balance   - Continued inability to participate in vocational pursuits    Short Term Goals (9  Weeks):  - Pt will increase ROM of right ankle = left.  - Pt will increase strength of bilateral hip abd = 5/5  - Decrease Pain to 1/10 with ADLs  - Pt to self correct posture independently with normalized gait pattern.   - Pt independent with HEP with progressions.      Long Term Goals (18 Weeks):  - Pt with normalized SLS mechanics x 2 min  - Pt with 94% Ybalance scores  - Decrease Pain to 0/10 with jogging, boxing  - Pt to return to boxing without pain.       Plan   Continue with established Plan of Care towards PT goals.      Therapist: Dylan Prieto PTA

## 2017-02-08 ENCOUNTER — OFFICE VISIT (OUTPATIENT)
Dept: SPORTS MEDICINE | Facility: CLINIC | Age: 37
End: 2017-02-08
Payer: COMMERCIAL

## 2017-02-08 VITALS
SYSTOLIC BLOOD PRESSURE: 136 MMHG | HEART RATE: 94 BPM | DIASTOLIC BLOOD PRESSURE: 82 MMHG | BODY MASS INDEX: 40.18 KG/M2 | HEIGHT: 66 IN | WEIGHT: 250 LBS

## 2017-02-08 DIAGNOSIS — T81.30XA WOUND DEHISCENCE: Primary | ICD-10-CM

## 2017-02-08 PROCEDURE — 99024 POSTOP FOLLOW-UP VISIT: CPT | Mod: S$GLB,,, | Performed by: PHYSICIAN ASSISTANT

## 2017-02-08 PROCEDURE — 99999 PR PBB SHADOW E&M-EST. PATIENT-LVL III: CPT | Mod: PBBFAC,,, | Performed by: PHYSICIAN ASSISTANT

## 2017-02-08 NOTE — PROGRESS NOTES
Subjective:          Chief Complaint: Anjelica Vizcaino is a 36 y.o. male who had concerns including Pain of the Right Lower Leg.    HPI Comments: 37 yo male patient 8 weeks  s/p below; he is currently in therapy at [place] with Jamaal Bills. Pt is here for wound check. He is being following by Enrico Walden for his wound. He is still non weight bearing due to dehiscense of wound.      DATE OF PROCEDURE: 12/08/2016     ATTENDING SURGEON: Princess Bai M.D.     FIRST ASSISTANT: Rex Lange MD - fellow.     SECOND ASSISTANT: SMA Alfreda - assistant.     PREOPERATIVE DIAGNOSIS: Right Achilles tendon tear.     POSTOPERATIVE DIAGNOSIS: Right Achilles tendon tear (complex high-grade   interstitial).     OPERATIVE PROCEDURE PERFORMED:  1. Right complex Achilles tendon repair.  2. Right placental graft application. CPT code 10875, reference CPT code   93430.    Handedness: right-handed  Sport played:    Level:            Pain   Pertinent negatives include no chest pain, chills, diaphoresis, fever, joint swelling, myalgias, nausea, numbness, rash, vomiting or weakness.   Ankle Pain    This is a new problem. The current episode started in the past 7 days. There has been a history of trauma. Pertinent negatives include no fever, numbness or stiffness. There is no history of gout.       Review of Systems   Constitution: Negative for chills, decreased appetite, diaphoresis, fever, weakness, malaise/fatigue, night sweats, weight gain and weight loss.   HENT: Negative for hearing loss.    Eyes: Negative for blurred vision, double vision, pain and visual disturbance.   Cardiovascular: Negative for chest pain, cyanosis, dyspnea on exertion, leg swelling, orthopnea and palpitations.   Respiratory: Negative for hemoptysis, shortness of breath and wheezing.    Endocrine: Negative for polyuria.   Hematologic/Lymphatic: Negative for bleeding problem.   Skin: Negative for color change and rash.   Musculoskeletal: Positive for  joint pain. Negative for arthritis, back pain, falls, gout, joint swelling, muscle cramps, muscle weakness, myalgias and stiffness.   Gastrointestinal: Negative for hematemesis, hematochezia, melena, nausea and vomiting.   Genitourinary: Negative for dysuria, frequency and hematuria.   Neurological: Negative for dizziness, light-headedness, loss of balance, numbness and paresthesias.   Psychiatric/Behavioral: Negative for altered mental status, depression and memory loss. The patient does not have insomnia and is not nervous/anxious.        Pain Related Questions  Over the past 3 days, what was your average pain during activity? (I.e. running, jogging, walking, climbing stairs, getting dressed, ect.): 0  Over the past 3 days, what was your highest pain level?: 0  Over the past 3 days, what was your lowest pain level? : 0    Other  How many nights a week are you awakened by your affected body part?: 0  Was the patient's HEIGHT measured or patient reported?: Patient Reported  Was the patient's WEIGHT measured or patient reported?: Patient Reported      Objective:        General: Anjelica is well-developed, well-nourished, appears stated age, in no acute distress, alert and oriented to time, place and person.     General    Vitals reviewed.  Constitutional: He is oriented to person, place, and time. He appears well-developed and well-nourished. No distress.   HENT:   Mouth/Throat: No oropharyngeal exudate.   Eyes: Right eye exhibits no discharge. Left eye exhibits no discharge.   Neck: Normal range of motion.   Cardiovascular: Normal rate.    Pulmonary/Chest: Breath sounds normal. No respiratory distress.   Neurological: He is alert and oriented to person, place, and time. He has normal reflexes. No cranial nerve deficit. Coordination normal.   Psychiatric: He has a normal mood and affect. His behavior is normal. Judgment and thought content normal.     General Musculoskeletal Exam   Gait: normal     Right Ankle/Foot  Exam     Inspection   Scars: present  Deformity: absent  Erythema: absent  Bruising: Ankle - absent Foot - absent  Effusion: Ankle - absent Foot - absent  Atrophy: Ankle - absent Foot - absent    Tenderness   The patient is tender to palpation of the Achilles tendon.    Range of Motion   Ankle Joint   Dorsiflexion:  10 normal   Plantar flexion:  35 normal   Subtalar Joint   Inversion:  15 normal   Eversion:  5 normal   Burrows Test:  positive  First MTP Joint: normal    Alignment   Knee Alignment: neutral  Hindfoot Alignment: neutral  Midfoot Alignment: normal  Forefoot Alignment: normal    Tests   Anterior drawer: 1+  Varus tilt: 1+  Heel Walk: able to perform  Tiptoe Walk: unable to perform  Single Heel Rise: unable to perform  External Rotation Test: negative  Squeeze Test: negative    Other   Ankle Crepitus: absent  Foot Crepitus:  absent  Sensation: normal  Peroneal Subluxation: negative    Comments:  Wound; opening at superior aspect of the wound; 0.5cm x 0.5cm. Wound improving. No signs of infection. No purulent drainage  Collagen had been applied to wound by Enrico Walden.    Left Ankle/Foot Exam     Inspection  Deformity: absent  Erythema: absent  Bruising: Ankle - absent Foot - absent  Effusion: Ankle - absent Foot - absent  Atrophy: Ankle - absent Foot - absent  Scars: absent    Range of Motion   Ankle Joint  Dorsiflexion:  10 normal   Plantar flexion:  35 normal     Subtalar Joint   Inversion:  15 normal   Eversion:  5 normal   Burrows Test:  normal  First MTP Joint: normal    Alignment   Knee Alignment: neutral  Hindfoot Alignment: neutral  Midfoot Alignment: normal  Forefoot Alignment: normal    Tests   Anterior drawer: 1+  Varus tilt: 1+  Heel Walk: able to perform  Tiptoe Walk: able to perform  Single Heel Rise: able to perform  External Rotation Test: negative  Squeeze Test: absent    Other   Foot Crepitus:  absent  Ankle Crepitus: absent  Sensation: normal  Peroneal Subluxation:  negative      Muscle Strength   Right Lower Extremity   Anterior tibial:  5/5/5  Posterior tibial:  5/5/5  Gastrocsoleus:  4/5/5  Peroneal muscle:  5/5/5  EHL:  5/5  FDL: 5/5  EDL: 5/5  FHL: 5/5  Left Lower Extremity   Anterior tibial:  5/5/5   Posterior tibial:  5/5/5  Gastrocsoleus:  5/5/5  Peroneal muscle:  5/5/5  EHL:  5/5  FDL: 5/5  EDL: 5/5  FHL: 5/5    Reflexes     Left Side  Post Tibial:  2+  Achilles:  2+  Plantar Reflex: 2+    Right Side   Post Tibial:  2+  Achilles:  2+  Plantar Reflex: 2+    Vascular Exam     Right Pulses  Dorsalis Pedis:      2+  Posterior Tibial:      2+        Left Pulses  Dorsalis Pedis:      2+  Posterior Tibial:      2+        Edema  Right Lower Leg: absent  Left Lower Leg: absent              Assessment:       Encounter Diagnosis   Name Primary?    Wound dehiscence Yes          Plan:       1. Ankle/foot function, SF-12 was not filled out today in clinic.     RTC in 1 weeks with Ruthy Lynn PA-C for wound check. Patient will not fill out Ankle/foot function, SF-12 on return.    2. Continue antibiotics     3. Continue PT and progress per protocol    4. Continue following up Enrico Walden     5. WEIGHTBEARING STATUS: Advance as tolerated.

## 2017-02-08 NOTE — MR AVS SNAPSHOT
[facility]  [address]  [facility] [address]  Phone: [phone]                  Anjelica Vizcaino   2017 3:30 PM   Appointment    Description:  Male : 1980   Provider:  Ruthy Lynn PA-C   Department:  [facility]                To Do List           Future Appointments        Provider Department Dept Phone    2017 9:30 AM Jamaal Bills, PT [facility] [phone]    2/15/2017 9:30 AM Jamaal Bills, PT [facility] [phone]    2017 9:30 AM Jamaal Bills, PT [facility] [phone]    2017 10:30 AM Jamaal Bills, PT [facility] [phone]    2017 9:30 AM Jamaal Bills, PT [facility] [phone]      Goals (5 Years of Data)     None      [facility]     [facility] -  Assistance  Registered nurses in the [facility] provide clinical advisement, health education, appointment booking, and other advisory services.  Call for this free service at [phone].             Medications           Message regarding Medications     Verify the changes and/or additions to your medication regime listed below are the same as discussed with your clinician today.  If any of these changes or additions are incorrect, please notify your healthcare provider.             Verify that the below list of medications is an accurate representation of the medications you are currently taking.  If none reported, the list may be blank. If incorrect, please contact your healthcare provider. Carry this list with you in case of emergency.           Current Medications     aspirin 325 MG tablet Take 1 tablet (325 mg total) by mouth once daily.    ibuprofen (ADVIL,MOTRIN) 200 MG tablet Take 200 mg by mouth every 6 (six) hours as needed for Pain.    oxycodone-acetaminophen (PERCOCET)  mg per tablet Take 1  tablet by mouth every 4 (four) hours as needed for Pain.    promethazine (PHENERGAN) 25 MG tablet Take 1 tablet (25 mg total) by mouth every 4 (four) hours.           Clinical Reference Information           Allergies as of 2/8/2017     No Known Allergies      Immunizations Administered on Date of Encounter - 2/8/2017     None      Language Assistance Services     ATTENTION: Language assistance services are available, free of charge. Please call [phone].      ATENCIÓN: Si habla dav, tiene a deutsch disposición servicios gratuitos de asistencia lingüística. Llame al [phone].     CHÚ Ý: N?u b?n nói Ti?ng Vi?t, có các d?ch v? h? tr? ngôn ng? mi?n phí dành cho b?n. G?i s? [phone].         [facility] complies with applicable Federal civil rights laws and does not discriminate on the basis of race, color, national origin, age, disability, or sex.

## 2017-02-13 ENCOUNTER — CLINICAL SUPPORT (OUTPATIENT)
Dept: REHABILITATION | Facility: HOSPITAL | Age: 37
End: 2017-02-13
Attending: ORTHOPAEDIC SURGERY
Payer: COMMERCIAL

## 2017-02-13 DIAGNOSIS — M25.571 ACUTE RIGHT ANKLE PAIN: Primary | ICD-10-CM

## 2017-02-13 PROCEDURE — 97110 THERAPEUTIC EXERCISES: CPT | Performed by: PHYSICAL THERAPIST

## 2017-02-13 NOTE — PROGRESS NOTES
Physical Therapy Daily Note     Date: 02/13/2017  Name: Anjelica Vizcaino  Clinic Number: [id number]  Diagnosis:   Encounter Diagnosis   Name Primary?    Acute right ankle pain Yes     Diagnosis: OPERATIVE PROCEDURE PERFORMED:  1. Right complex Achilles tendon repair.  2. Right placental graft application    Physician: Rex Lange*    Evaluation Date: 1/16/17  Date of Surgery: 12/8/16  Visit #: 6  Start Time:  9:30  Stop Time:  10:35  Total Treatment Time: 65    Precautions: WEIGHTBEARING STATUS: Nonweightbearing for 6 weeks, partial weightbearing at   6-7 weeks and full weightbearing by 7-8 weeks with fracture walking boot in   place with ambulation at 6-7 and 8 weeks' time.      Following 6 weeks to 8 weeks protection, the patient could be advanced to a   standard Achilles tendon repair protocol, but should avoid high impact type   loads approximately 4-6 months following surgery.    Subjective     Pt without complaints.     Pain: 0/10    Objective     Measurements taken: none  FOTO: 35% 2/6/17  Patient received individual therapy to increase strength, endurance and ROM with activities as follows:     Anjelica received therapeutic exercises to develop strength, endurance and ROM for 40 minutes including:   SL bridges 3x10  Clamshells 3x10  Shuttle DL 4c 3x10  Shuttle SL 2.5c 3x10  Shuttle 2-1 2c 3x10  6 in step ups 3x10  Gait with cones and without x 10 min        Anjelica received the following manual therapy techniques: Joint mobilizations and Soft tissue Mobilization were applied to the: right ankle for 15 minutes. NP  - PROM: all  - posterior talus mobs    Pt received wound care from wound care specialist PT.     Written Home Exercises Provided: updated as per therex list  Pt demo good understanding of the education provided. Anjelica demonstrated good return demonstration of activities.     Education provided:  Anjelica verbalized good understanding of  education provided.   No spiritual or educational barriers to learning identified.    Assessment     Excellent tolerance to gait with improved parameters with practice. Continue to progress stability and weight bearing loading mechanics.    This is a 36 y.o. male referred to outpatient physical therapy and presents with a medical diagnosis of right ankle pain and demonstrates limitations as described in the problem list Pt prognosis is Good. Pt will continue to benefit from skilled outpatient physical therapy to address the deficits listed below in the problem list, provide pt/family education and to maximize pt's level of independence in the home and community environment.    Will the patient continue to benefit from skilled PT intervention? yes        Medical necessity is demonstrated by:   - Pain limits function of effected part for all activities  - Unable to participate in daily activities   - Requires skilled supervision to complete and progress HEP  - Fall risk - impaired balance   - Continued inability to participate in vocational pursuits    Short Term Goals (9  Weeks):  - Pt will increase ROM of right ankle = left.  - Pt will increase strength of bilateral hip abd = 5/5  - Decrease Pain to 1/10 with ADLs  - Pt to self correct posture independently with normalized gait pattern.   - Pt independent with HEP with progressions.      Long Term Goals (18 Weeks):  - Pt with normalized SLS mechanics x 2 min  - Pt with 94% Ybalance scores  - Decrease Pain to 0/10 with jogging, boxing  - Pt to return to boxing without pain.       Plan   Continue with established Plan of Care towards PT goals.      Therapist: ALESIA SMALLWOOD, PT

## 2017-02-15 ENCOUNTER — CLINICAL SUPPORT (OUTPATIENT)
Dept: REHABILITATION | Facility: HOSPITAL | Age: 37
End: 2017-02-15
Attending: ORTHOPAEDIC SURGERY
Payer: COMMERCIAL

## 2017-02-15 ENCOUNTER — OFFICE VISIT (OUTPATIENT)
Dept: SPORTS MEDICINE | Facility: CLINIC | Age: 37
End: 2017-02-15
Payer: COMMERCIAL

## 2017-02-15 VITALS
BODY MASS INDEX: 40.18 KG/M2 | DIASTOLIC BLOOD PRESSURE: 92 MMHG | SYSTOLIC BLOOD PRESSURE: 147 MMHG | HEART RATE: 103 BPM | WEIGHT: 250 LBS | HEIGHT: 66 IN

## 2017-02-15 DIAGNOSIS — M25.571 ACUTE RIGHT ANKLE PAIN: Primary | ICD-10-CM

## 2017-02-15 DIAGNOSIS — T81.30XA WOUND DEHISCENCE: Primary | ICD-10-CM

## 2017-02-15 PROCEDURE — 97110 THERAPEUTIC EXERCISES: CPT | Performed by: PHYSICAL THERAPIST

## 2017-02-15 PROCEDURE — 99024 POSTOP FOLLOW-UP VISIT: CPT | Mod: S$GLB,,, | Performed by: PHYSICIAN ASSISTANT

## 2017-02-15 PROCEDURE — 99999 PR PBB SHADOW E&M-EST. PATIENT-LVL III: CPT | Mod: PBBFAC,,, | Performed by: PHYSICIAN ASSISTANT

## 2017-02-15 NOTE — MR AVS SNAPSHOT
[facility]  [address]  [facility] [address]  Phone: [phone]                  Anjelica Vizcaino   2/15/2017 4:30 PM   Appointment    Description:  Male : 1980   Provider:  Ruthy Lynn PA-C   Department:  [facility]                To Do List           Future Appointments        Provider Department Dept Phone    2/15/2017 4:30 PM Ruthy Lynn PA-C [facility] [phone]    2017 9:30 AM Jamaal Bills, PT [facility] [phone]    2017 10:30 AM Jamaal Bills, PT [facility] [phone]    2017 9:30 AM Jamaal Bills, PT [facility] [phone]      Goals (5 Years of Data)     None      [facility]     [facility] -  Assistance  Registered nurses in the [facility] provide clinical advisement, health education, appointment booking, and other advisory services.  Call for this free service at [phone].             Medications           Message regarding Medications     Verify the changes and/or additions to your medication regime listed below are the same as discussed with your clinician today.  If any of these changes or additions are incorrect, please notify your healthcare provider.             Verify that the below list of medications is an accurate representation of the medications you are currently taking.  If none reported, the list may be blank. If incorrect, please contact your healthcare provider. Carry this list with you in case of emergency.           Current Medications     aspirin 325 MG tablet Take 1 tablet (325 mg total) by mouth once daily.    ibuprofen (ADVIL,MOTRIN) 200 MG tablet Take 200 mg by mouth every 6 (six) hours as needed for Pain.    oxycodone-acetaminophen (PERCOCET)  mg per tablet Take 1 tablet by mouth every 4 (four) hours as needed for Pain.    promethazine (PHENERGAN) 25  MG tablet Take 1 tablet (25 mg total) by mouth every 4 (four) hours.           Clinical Reference Information           Allergies as of 2/15/2017     No Known Allergies      Immunizations Administered on Date of Encounter - 2/15/2017     None      Language Assistance Services     ATTENTION: Language assistance services are available, free of charge. Please call [phone].      ATENCIÓN: Si habla dav, tiene a deutsch disposición servicios gratuitos de asistencia lingüística. Llame al [phone].     CHÚ Ý: N?u b?n nói Ti?ng Vi?t, có các d?ch v? h? tr? ngôn ng? mi?n phí dành cho b?n. G?i s? [phone].         [facility] complies with applicable Federal civil rights laws and does not discriminate on the basis of race, color, national origin, age, disability, or sex.

## 2017-02-15 NOTE — PROGRESS NOTES
Subjective:          Chief Complaint: Anjelica Vizcaino is a 36 y.o. male who had concerns including Pain of the Right Lower Leg.    HPI Comments: 37 yo male patient 9 weeks  s/p below; he is currently in therapy at [place] with Jamaal Bills. Pt is here for wound check. He is being following by Enrico Walden for his wound. He is weight bearing as tolerated with one crutch.      DATE OF PROCEDURE: 12/08/2016     ATTENDING SURGEON: Princess Bai M.D.     FIRST ASSISTANT: Rex Lange MD - fellow.     SECOND ASSISTANT: SMA Alfreda - assistant.     PREOPERATIVE DIAGNOSIS: Right Achilles tendon tear.     POSTOPERATIVE DIAGNOSIS: Right Achilles tendon tear (complex high-grade   interstitial).     OPERATIVE PROCEDURE PERFORMED:  1. Right complex Achilles tendon repair.  2. Right placental graft application. CPT code 20737, reference CPT code   02254.    Handedness: right-handed  Sport played:    Level:            Pain   Pertinent negatives include no chest pain, chills, diaphoresis, fever, joint swelling, myalgias, nausea, numbness, rash, vomiting or weakness.   Ankle Pain    This is a new problem. The current episode started in the past 7 days. There has been a history of trauma. Pertinent negatives include no fever, numbness or stiffness. There is no history of gout.       Review of Systems   Constitution: Negative for chills, decreased appetite, diaphoresis, fever, weakness, malaise/fatigue, night sweats, weight gain and weight loss.   HENT: Negative for hearing loss.    Eyes: Negative for blurred vision, double vision, pain and visual disturbance.   Cardiovascular: Negative for chest pain, cyanosis, dyspnea on exertion, leg swelling, orthopnea and palpitations.   Respiratory: Negative for hemoptysis, shortness of breath and wheezing.    Endocrine: Negative for polyuria.   Hematologic/Lymphatic: Negative for bleeding problem.   Skin: Negative for color change and rash.   Musculoskeletal: Positive for joint  pain. Negative for arthritis, back pain, falls, gout, joint swelling, muscle cramps, muscle weakness, myalgias and stiffness.   Gastrointestinal: Negative for hematemesis, hematochezia, melena, nausea and vomiting.   Genitourinary: Negative for dysuria, frequency and hematuria.   Neurological: Negative for dizziness, light-headedness, loss of balance, numbness and paresthesias.   Psychiatric/Behavioral: Negative for altered mental status, depression and memory loss. The patient does not have insomnia and is not nervous/anxious.        Pain Related Questions  Over the past 3 days, what was your average pain during activity? (I.e. running, jogging, walking, climbing stairs, getting dressed, ect.): 0  Over the past 3 days, what was your highest pain level?: 0  Over the past 3 days, what was your lowest pain level? : 0    Other  How many nights a week are you awakened by your affected body part?: 0  Was the patient's HEIGHT measured or patient reported?: Patient Reported  Was the patient's WEIGHT measured or patient reported?: Patient Reported      Objective:        General: Anjelica is well-developed, well-nourished, appears stated age, in no acute distress, alert and oriented to time, place and person.     General    Vitals reviewed.  Constitutional: He is oriented to person, place, and time. He appears well-developed and well-nourished. No distress.   HENT:   Mouth/Throat: No oropharyngeal exudate.   Eyes: Right eye exhibits no discharge. Left eye exhibits no discharge.   Neck: Normal range of motion.   Cardiovascular: Normal rate.    Pulmonary/Chest: Breath sounds normal. No respiratory distress.   Neurological: He is alert and oriented to person, place, and time. He has normal reflexes. No cranial nerve deficit. Coordination normal.   Psychiatric: He has a normal mood and affect. His behavior is normal. Judgment and thought content normal.     General Musculoskeletal Exam   Gait: normal     Right Ankle/Foot Exam      Inspection   Scars: present  Deformity: absent  Erythema: absent  Bruising: Ankle - absent Foot - absent  Effusion: Ankle - absent Foot - absent  Atrophy: Ankle - absent Foot - absent    Swelling   The patient is swollen on the Achilles tendon.    Tenderness   The patient is tender to palpation of the Achilles tendon.    Range of Motion   Ankle Joint   Dorsiflexion:  10 normal   Plantar flexion:  35 normal   Subtalar Joint   Inversion:  15 normal   Eversion:  5 normal   Burrows Test:  positive  First MTP Joint: normal    Alignment   Knee Alignment: neutral  Hindfoot Alignment: neutral  Midfoot Alignment: normal  Forefoot Alignment: normal    Tests   Anterior drawer: 1+  Varus tilt: 1+  Heel Walk: able to perform  Tiptoe Walk: unable to perform  Single Heel Rise: unable to perform  External Rotation Test: negative  Squeeze Test: negative    Other   Ankle Crepitus: absent  Foot Crepitus:  absent  Sensation: normal  Peroneal Subluxation: negative    Comments:  Wound; Scab at superior aspect of the wound; 0.2cm x 0.3cm. Wound improving. No signs of infection. No purulent drainage      Left Ankle/Foot Exam     Inspection  Deformity: absent  Erythema: absent  Bruising: Ankle - absent Foot - absent  Effusion: Ankle - absent Foot - absent  Atrophy: Ankle - absent Foot - absent  Scars: absent    Range of Motion   Ankle Joint  Dorsiflexion:  10 normal   Plantar flexion:  35 normal     Subtalar Joint   Inversion:  15 normal   Eversion:  5 normal   Burrows Test:  normal  First MTP Joint: normal    Alignment   Knee Alignment: neutral  Hindfoot Alignment: neutral  Midfoot Alignment: normal  Forefoot Alignment: normal    Tests   Anterior drawer: 1+  Varus tilt: 1+  Heel Walk: able to perform  Tiptoe Walk: able to perform  Single Heel Rise: able to perform  External Rotation Test: negative  Squeeze Test: absent    Other   Foot Crepitus:  absent  Ankle Crepitus: absent  Sensation: normal  Peroneal Subluxation:  negative      Muscle Strength   Right Lower Extremity   Anterior tibial:  5/5/5  Posterior tibial:  5/5/5  Gastrocsoleus:  4/5/5  Peroneal muscle:  5/5/5  EHL:  5/5  FDL: 5/5  EDL: 5/5  FHL: 5/5  Left Lower Extremity   Anterior tibial:  5/5/5   Posterior tibial:  5/5/5  Gastrocsoleus:  5/5/5  Peroneal muscle:  5/5/5  EHL:  5/5  FDL: 5/5  EDL: 5/5  FHL: 5/5    Reflexes     Left Side  Post Tibial:  2+  Achilles:  2+  Plantar Reflex: 2+    Right Side   Post Tibial:  2+  Achilles:  2+  Plantar Reflex: 2+    Vascular Exam     Right Pulses  Dorsalis Pedis:      2+  Posterior Tibial:      2+        Left Pulses  Dorsalis Pedis:      2+  Posterior Tibial:      2+        Edema  Right Lower Leg: absent  Left Lower Leg: absent              Assessment:       Encounter Diagnosis   Name Primary?    Wound dehiscence Yes          Plan:       1. Ankle/foot function, SF-12 was not filled out today in clinic.     RTC in 3 weeks with Dr. Princess Bai for 3 month post-op. Patient will not fill out Ankle/foot function, SF-12 on return.    2. Continue antibiotics     3. Continue PT and progress per protocol    4. WEIGHTBEARING STATUS: Advance as tolerated.

## 2017-02-18 NOTE — PROGRESS NOTES
"                                                  Physical Therapy Daily Note     Date: 02/18/2017  Name: Anjelica Vizcaino  Clinic Number: [id number]  Diagnosis:   Encounter Diagnosis   Name Primary?    Acute right ankle pain Yes     Diagnosis: OPERATIVE PROCEDURE PERFORMED:  1. Right complex Achilles tendon repair.  2. Right placental graft application    Physician: Rex Lange*    Evaluation Date: 1/16/17  Date of Surgery: 12/8/16  Visit #: 7  Start Time:  3:30  Stop Time:  4:30  Total Treatment Time: 60    Precautions: WEIGHTBEARING STATUS: Nonweightbearing for 6 weeks, partial weightbearing at   6-7 weeks and full weightbearing by 7-8 weeks with fracture walking boot in   place with ambulation at 6-7 and 8 weeks' time.      Following 6 weeks to 8 weeks protection, the patient could be advanced to a   standard Achilles tendon repair protocol, but should avoid high impact type   loads approximately 4-6 months following surgery.    Subjective     Pt without new complaints.     Pain: 0/10    Objective     Measurements taken: none  FOTO: 35% 2/6/17  Patient received individual therapy to increase strength, endurance and ROM with activities as follows:     Anjelica received therapeutic exercises to develop strength, endurance and ROM for 40 minutes including:   SL bridges 3x10  Clamshells 3x10  Shuttle DL 4c 3x10  Shuttle SL 2.5c 3x10  Shuttle 2-1 2c 3x10  6 in step ups 3x10  6 in step downs 3x10  Wall sits 3x30"  Gait with cones and without x 10 min        Anjelica received the following manual therapy techniques: Joint mobilizations and Soft tissue Mobilization were applied to the: right ankle for 15 minutes. NP  - PROM: all  - posterior talus mobs    Pt received wound care from wound care specialist PT.     Written Home Exercises Provided: updated as per therex list  Pt demo good understanding of the education provided. Anjelica demonstrated good return demonstration of activities.     Education " provided:  Anjelica verbalized good understanding of education provided.   No spiritual or educational barriers to learning identified.    Assessment     Improving closed chain strength and single leg stability with rx.    This is a 36 y.o. male referred to outpatient physical therapy and presents with a medical diagnosis of right ankle pain and demonstrates limitations as described in the problem list Pt prognosis is Good. Pt will continue to benefit from skilled outpatient physical therapy to address the deficits listed below in the problem list, provide pt/family education and to maximize pt's level of independence in the home and community environment.    Will the patient continue to benefit from skilled PT intervention? yes        Medical necessity is demonstrated by:   - Pain limits function of effected part for all activities  - Unable to participate in daily activities   - Requires skilled supervision to complete and progress HEP  - Fall risk - impaired balance   - Continued inability to participate in vocational pursuits    Short Term Goals (9  Weeks):  - Pt will increase ROM of right ankle = left.  - Pt will increase strength of bilateral hip abd = 5/5  - Decrease Pain to 1/10 with ADLs  - Pt to self correct posture independently with normalized gait pattern.   - Pt independent with HEP with progressions.      Long Term Goals (18 Weeks):  - Pt with normalized SLS mechanics x 2 min  - Pt with 94% Ybalance scores  - Decrease Pain to 0/10 with jogging, boxing  - Pt to return to boxing without pain.       Plan   Continue with established Plan of Care towards PT goals.      Therapist: ALESIA SMALLWOOD, PT

## 2017-02-20 ENCOUNTER — CLINICAL SUPPORT (OUTPATIENT)
Dept: REHABILITATION | Facility: HOSPITAL | Age: 37
End: 2017-02-20
Attending: ORTHOPAEDIC SURGERY
Payer: COMMERCIAL

## 2017-02-20 DIAGNOSIS — M25.571 ACUTE RIGHT ANKLE PAIN: Primary | ICD-10-CM

## 2017-02-20 PROCEDURE — 97110 THERAPEUTIC EXERCISES: CPT | Performed by: PHYSICAL THERAPIST

## 2017-02-20 NOTE — PROGRESS NOTES
"                                                  Physical Therapy Daily Note     Date: 02/20/2017  Name: Anjelica Vizcaino  Clinic Number: [id number]  Diagnosis:   Encounter Diagnosis   Name Primary?    Acute right ankle pain Yes     Diagnosis: OPERATIVE PROCEDURE PERFORMED:  1. Right complex Achilles tendon repair.  2. Right placental graft application    Physician: Rex Lange*    Evaluation Date: 1/16/17  Date of Surgery: 12/8/16  Visit #: 8  Start Time:  9:30  Stop Time:  10:30  Total Treatment Time: 60    Precautions: WEIGHTBEARING STATUS: Nonweightbearing for 6 weeks, partial weightbearing at   6-7 weeks and full weightbearing by 7-8 weeks with fracture walking boot in   place with ambulation at 6-7 and 8 weeks' time.      Following 6 weeks to 8 weeks protection, the patient could be advanced to a   standard Achilles tendon repair protocol, but should avoid high impact type   loads approximately 4-6 months following surgery.    Subjective     Pt states I am getting stronger.     Pain: 0/10    Objective     Measurements taken: none  FOTO: 35% 2/6/17  Patient received individual therapy to increase strength, endurance and ROM with activities as follows:     Anjelica received therapeutic exercises to develop strength, endurance and ROM for 40 minutes including:   SL bridges 3x10  Clamshells 3x10  Shuttle DL 4c 3x10  Shuttle SL 2.5c 3x10  Shuttle 2-1 2c 3x10  6 in step ups 3x10  6 in step downs 3x10  Wall sits 3x30"  SL RDLs 2x10  SL shoulder taps on wall 2x20      Anjelica received the following manual therapy techniques: Joint mobilizations and Soft tissue Mobilization were applied to the: right ankle for 15 minutes. NP  - PROM: all  - posterior talus mobs    Pt received wound care from wound care specialist PT.     Written Home Exercises Provided: updated as per therex list  Pt demo good understanding of the education provided. Anjelica demonstrated good return demonstration of activities.     Education " provided:  Anjelica verbalized good understanding of education provided.   No spiritual or educational barriers to learning identified.    Assessment     Closed chain stability and SLB continue to improve with rx. Continue single leg stabilization therex.     This is a 36 y.o. male referred to outpatient physical therapy and presents with a medical diagnosis of right ankle pain and demonstrates limitations as described in the problem list Pt prognosis is Good. Pt will continue to benefit from skilled outpatient physical therapy to address the deficits listed below in the problem list, provide pt/family education and to maximize pt's level of independence in the home and community environment.    Will the patient continue to benefit from skilled PT intervention? yes        Medical necessity is demonstrated by:   - Pain limits function of effected part for all activities  - Unable to participate in daily activities   - Requires skilled supervision to complete and progress HEP  - Fall risk - impaired balance   - Continued inability to participate in vocational pursuits    Short Term Goals (9  Weeks):  - Pt will increase ROM of right ankle = left.  - Pt will increase strength of bilateral hip abd = 5/5  - Decrease Pain to 1/10 with ADLs  - Pt to self correct posture independently with normalized gait pattern.   - Pt independent with HEP with progressions.      Long Term Goals (18 Weeks):  - Pt with normalized SLS mechanics x 2 min  - Pt with 94% Ybalance scores  - Decrease Pain to 0/10 with jogging, boxing  - Pt to return to boxing without pain.       Plan   Continue with established Plan of Care towards PT goals.      Therapist: ALESIA SMALLWOOD, PT

## 2017-02-27 ENCOUNTER — CLINICAL SUPPORT (OUTPATIENT)
Dept: REHABILITATION | Facility: HOSPITAL | Age: 37
End: 2017-02-27
Attending: ORTHOPAEDIC SURGERY
Payer: COMMERCIAL

## 2017-02-27 DIAGNOSIS — M25.571 ACUTE RIGHT ANKLE PAIN: Primary | ICD-10-CM

## 2017-02-27 PROCEDURE — 97110 THERAPEUTIC EXERCISES: CPT | Performed by: PHYSICAL THERAPIST

## 2017-02-27 NOTE — PROGRESS NOTES
"                                                  Physical Therapy Daily Note     Date: 02/27/2017  Name: Anjelica Vizcaino  Clinic Number: [id number]  Diagnosis:   Encounter Diagnosis   Name Primary?    Acute right ankle pain Yes     Diagnosis: OPERATIVE PROCEDURE PERFORMED:  1. Right complex Achilles tendon repair.  2. Right placental graft application    Physician: Rex Lange*    Evaluation Date: 1/16/17  Date of Surgery: 12/8/16  Visit #: 9  Start Time:  9:30  Stop Time:  10:30  Total Treatment Time: 60    Precautions: WEIGHTBEARING STATUS: Nonweightbearing for 6 weeks, partial weightbearing at   6-7 weeks and full weightbearing by 7-8 weeks with fracture walking boot in   place with ambulation at 6-7 and 8 weeks' time.      Following 6 weeks to 8 weeks protection, the patient could be advanced to a   standard Achilles tendon repair protocol, but should avoid high impact type   loads approximately 4-6 months following surgery.    Subjective     Pt states the ankle is feeling better. The balance is better.     Pain: 0/10    Objective     Measurements taken: none  FOTO: 35% 2/6/17  Patient received individual therapy to increase strength, endurance and ROM with activities as follows:     Anjelica received therapeutic exercises to develop strength, endurance and ROM for 40 minutes including:   SL bridges 3x10  Clamshells 3x10  Shuttle DL 4c 3x10  Shuttle SL 2.5c 3x10  Shuttle 2-1 2c 3x10  6 in step ups 3x10  6 in step downs 3x10  Wall sits 3x30"  SL RDLs 2x10  SL shoulder taps on wall 2x20  Pilates SLS 3x10      Anjelica received the following manual therapy techniques: Joint mobilizations and Soft tissue Mobilization were applied to the: right ankle for 15 minutes. NP  - PROM: all  - posterior talus mobs    Pt received wound care from wound care specialist PT.     Written Home Exercises Provided: updated as per therex list  Pt demo good understanding of the education provided. Anjelica demonstrated good return " demonstration of activities.     Education provided:  Anjelica verbalized good understanding of education provided.   No spiritual or educational barriers to learning identified.    Assessment     Improving single leg stability/balance with rx. Continue to progress eccentric gastroc/soleus work with single leg stabilization.     This is a 36 y.o. male referred to outpatient physical therapy and presents with a medical diagnosis of right ankle pain and demonstrates limitations as described in the problem list Pt prognosis is Good. Pt will continue to benefit from skilled outpatient physical therapy to address the deficits listed below in the problem list, provide pt/family education and to maximize pt's level of independence in the home and community environment.    Will the patient continue to benefit from skilled PT intervention? yes        Medical necessity is demonstrated by:   - Pain limits function of effected part for all activities  - Unable to participate in daily activities   - Requires skilled supervision to complete and progress HEP  - Fall risk - impaired balance   - Continued inability to participate in vocational pursuits    Short Term Goals (9  Weeks):  - Pt will increase ROM of right ankle = left.  - Pt will increase strength of bilateral hip abd = 5/5  - Decrease Pain to 1/10 with ADLs  - Pt to self correct posture independently with normalized gait pattern.   - Pt independent with HEP with progressions.      Long Term Goals (18 Weeks):  - Pt with normalized SLS mechanics x 2 min  - Pt with 94% Ybalance scores  - Decrease Pain to 0/10 with jogging, boxing  - Pt to return to boxing without pain.       Plan   Continue with established Plan of Care towards PT goals.      Therapist: ALESIA SMALLWOOD, PT

## 2017-03-06 ENCOUNTER — OFFICE VISIT (OUTPATIENT)
Dept: SPORTS MEDICINE | Facility: CLINIC | Age: 37
End: 2017-03-06
Payer: COMMERCIAL

## 2017-03-06 ENCOUNTER — CLINICAL SUPPORT (OUTPATIENT)
Dept: REHABILITATION | Facility: HOSPITAL | Age: 37
End: 2017-03-06
Attending: ORTHOPAEDIC SURGERY
Payer: COMMERCIAL

## 2017-03-06 VITALS
DIASTOLIC BLOOD PRESSURE: 89 MMHG | WEIGHT: 250 LBS | HEIGHT: 66 IN | SYSTOLIC BLOOD PRESSURE: 131 MMHG | BODY MASS INDEX: 40.18 KG/M2 | HEART RATE: 102 BPM

## 2017-03-06 DIAGNOSIS — T81.30XA WOUND DEHISCENCE: Primary | ICD-10-CM

## 2017-03-06 DIAGNOSIS — S86.011S ACHILLES TENDON RUPTURE, RIGHT, SEQUELA: ICD-10-CM

## 2017-03-06 DIAGNOSIS — M25.571 ACUTE RIGHT ANKLE PAIN: Primary | ICD-10-CM

## 2017-03-06 PROCEDURE — 99999 PR PBB SHADOW E&M-EST. PATIENT-LVL III: CPT | Mod: PBBFAC,,, | Performed by: ORTHOPAEDIC SURGERY

## 2017-03-06 PROCEDURE — 97110 THERAPEUTIC EXERCISES: CPT | Performed by: PHYSICAL THERAPIST

## 2017-03-06 PROCEDURE — 99024 POSTOP FOLLOW-UP VISIT: CPT | Mod: S$GLB,,, | Performed by: ORTHOPAEDIC SURGERY

## 2017-03-06 NOTE — MR AVS SNAPSHOT
[facility]  [address]  [facility] [address]  Phone: [phone]                  Anjelica Vizcaino   3/6/2017 3:45 PM   Appointment    Description:  Male : 1980   Provider:  Princess Bai MD   Department:  [facility]                To Do List           Future Appointments        Provider Department Dept Phone    3/6/2017 2:30 PM Jamaal Bills PT [facility] [phone]    3/6/2017 3:45 PM Princess Bai MD [facility] [phone]    3/8/2017 8:30 AM Jamaal Bills PT [facility] [phone]    3/13/2017 9:30 AM Jamaal Bills PT [facility] [phone]    3/15/2017 1:30 PM Jamaal Bills, PT [facility] [phone]      Goals (5 Years of Data)     None      [facility]     [facility] Nurse Care [facility] -  Assistance  Registered nurses in the [facility] provide clinical advisement, health education, appointment booking, and other advisory services.  Call for this free service at [phone].             Medications           Message regarding Medications     Verify the changes and/or additions to your medication regime listed below are the same as discussed with your clinician today.  If any of these changes or additions are incorrect, please notify your healthcare provider.             Verify that the below list of medications is an accurate representation of the medications you are currently taking.  If none reported, the list may be blank. If incorrect, please contact your healthcare provider. Carry this list with you in case of emergency.           Current Medications     aspirin 325 MG tablet Take 1 tablet (325 mg total) by mouth once daily.    ibuprofen (ADVIL,MOTRIN) 200 MG tablet Take 200 mg by mouth every 6 (six) hours as needed for Pain.    oxycodone-acetaminophen (PERCOCET)  mg per tablet Take 1 tablet by mouth  every 4 (four) hours as needed for Pain.    promethazine (PHENERGAN) 25 MG tablet Take 1 tablet (25 mg total) by mouth every 4 (four) hours.           Clinical Reference Information           Allergies as of 3/6/2017     No Known Allergies      Immunizations Administered on Date of Encounter - 3/6/2017     None      Language Assistance Services     ATTENTION: Language assistance services are available, free of charge. Please call [phone].      ATENCIÓN: Si habla español, tiene a deutsch disposición servicios gratuitos de asistencia lingüística. Llame al [phone].     [facility] Ý: N?u b?n nói Ti?ng Vi?t, có các d?ch v? h? tr? ngôn ng? mi?n phí dành cho b?n. G?i s? [phone].         [facility] complies with applicable Federal civil rights laws and does not discriminate on the basis of race, color, national origin, age, disability, or sex.

## 2017-03-06 NOTE — PROGRESS NOTES
Subjective:          Chief Complaint: Anjelica Vizcaino is a 36 y.o. male who had concerns including Post-op Evaluation of the Right Ankle.    HPI Comments: Patient is here for a follow up for his right achilles.       DATE OF PROCEDURE: 12/08/2016     ATTENDING SURGEON: Princess Bai M.D.     FIRST ASSISTANT: Rex Lange MD - fellow.     SECOND ASSISTANT: SMA Alfreda - assistant.     PREOPERATIVE DIAGNOSIS: Right Achilles tendon tear.     POSTOPERATIVE DIAGNOSIS: Right Achilles tendon tear (complex high-grade   interstitial).     OPERATIVE PROCEDURE PERFORMED:  1. Right complex Achilles tendon repair.  2. Right placental graft application. CPT code 81672, reference CPT code   36731.    Handedness: right-handed  Sport played:    Level:            Pain   Pertinent negatives include no chest pain, chills, diaphoresis, fever, joint swelling, myalgias, nausea, numbness, rash, vomiting or weakness.   Ankle Pain    This is a new problem. The current episode started in the past 7 days. There has been a history of trauma. Pertinent negatives include no fever, numbness or stiffness. There is no history of gout.       Review of Systems   Constitution: Negative for chills, decreased appetite, diaphoresis, fever, weakness, malaise/fatigue, night sweats, weight gain and weight loss.   HENT: Negative for hearing loss.    Eyes: Negative for blurred vision, double vision, pain and visual disturbance.   Cardiovascular: Negative for chest pain, cyanosis, dyspnea on exertion, leg swelling, orthopnea and palpitations.   Respiratory: Negative for hemoptysis, shortness of breath and wheezing.    Endocrine: Negative for polyuria.   Hematologic/Lymphatic: Negative for bleeding problem.   Skin: Negative for color change and rash.   Musculoskeletal: Positive for joint pain. Negative for arthritis, back pain, falls, gout, joint swelling, muscle cramps, muscle weakness, myalgias and stiffness.   Gastrointestinal: Negative for  hematemesis, hematochezia, melena, nausea and vomiting.   Genitourinary: Negative for dysuria, frequency and hematuria.   Neurological: Negative for dizziness, light-headedness, loss of balance, numbness and paresthesias.   Psychiatric/Behavioral: Negative for altered mental status, depression and memory loss. The patient does not have insomnia and is not nervous/anxious.        Pain Related Questions  Over the past 3 days, what was your average pain during activity? (I.e. running, jogging, walking, climbing stairs, getting dressed, ect.): 0  Over the past 3 days, what was your highest pain level?: 0  Over the past 3 days, what was your lowest pain level? : 0    Other  How many nights a week are you awakened by your affected body part?: 0  Was the patient's HEIGHT measured or patient reported?: Patient Reported  Was the patient's WEIGHT measured or patient reported?: Measured      Objective:        General: Anjelica is well-developed, well-nourished, appears stated age, in no acute distress, alert and oriented to time, place and person.     General    Vitals reviewed.  Constitutional: He is oriented to person, place, and time. He appears well-developed and well-nourished. No distress.   HENT:   Mouth/Throat: No oropharyngeal exudate.   Eyes: Right eye exhibits no discharge. Left eye exhibits no discharge.   Neck: Normal range of motion.   Cardiovascular: Normal rate.    Pulmonary/Chest: Breath sounds normal. No respiratory distress.   Neurological: He is alert and oriented to person, place, and time. He has normal reflexes. No cranial nerve deficit. Coordination normal.   Psychiatric: He has a normal mood and affect. His behavior is normal. Judgment and thought content normal.     General Musculoskeletal Exam   Gait: normal     Right Ankle/Foot Exam     Inspection   Scars: present  Deformity: absent  Erythema: absent  Bruising: Ankle - absent Foot - absent  Effusion: Ankle - absent Foot - absent  Atrophy: Ankle -  absent Foot - absent    Swelling   The patient is swollen on the Achilles tendon.    Tenderness   The patient is tender to palpation of the Achilles tendon.    Range of Motion   Ankle Joint   Dorsiflexion:  10 normal   Plantar flexion:  35 normal   Subtalar Joint   Inversion:  15 normal   Eversion:  5 normal   Burrows Test:  positive  First MTP Joint: normal    Alignment   Knee Alignment: neutral  Hindfoot Alignment: neutral  Midfoot Alignment: normal  Forefoot Alignment: normal    Tests   Anterior drawer: 1+  Varus tilt: 1+  Heel Walk: able to perform  Tiptoe Walk: unable to perform  Single Heel Rise: unable to perform  External Rotation Test: negative  Squeeze Test: negative    Other   Ankle Crepitus: absent  Foot Crepitus:  absent  Sensation: normal  Peroneal Subluxation: negative    Comments:  Wound healed no signs of infection   Unable to preform single leg toe rise     Left Ankle/Foot Exam     Inspection  Deformity: absent  Erythema: absent  Bruising: Ankle - absent Foot - absent  Effusion: Ankle - absent Foot - absent  Atrophy: Ankle - absent Foot - absent  Scars: absent    Range of Motion   Ankle Joint  Dorsiflexion:  10 normal   Plantar flexion:  35 normal     Subtalar Joint   Inversion:  15 normal   Eversion:  5 normal   Burrows Test:  normal  First MTP Joint: normal    Alignment   Knee Alignment: neutral  Hindfoot Alignment: neutral  Midfoot Alignment: normal  Forefoot Alignment: normal    Tests   Anterior drawer: 1+  Varus tilt: 1+  Heel Walk: able to perform  Tiptoe Walk: able to perform  Single Heel Rise: able to perform  External Rotation Test: negative  Squeeze Test: absent    Other   Foot Crepitus:  absent  Ankle Crepitus: absent  Sensation: normal  Peroneal Subluxation: negative      Muscle Strength   Right Lower Extremity   Anterior tibial:  5/5/5  Posterior tibial:  5/5/5  Gastrocsoleus:  4/5/5  Peroneal muscle:  5/5/5  EHL:  5/5  FDL: 5/5  EDL: 5/5  FHL: 5/5  Left Lower Extremity   Anterior  tibial:  5/5/5   Posterior tibial:  5/5/5  Gastrocsoleus:  5/5/5  Peroneal muscle:  5/5/5  EHL:  5/5  FDL: 5/5  EDL: 5/5  FHL: 5/5    Reflexes     Left Side  Post Tibial:  2+  Achilles:  2+  Plantar Reflex: 2+    Right Side   Post Tibial:  2+  Achilles:  2+  Plantar Reflex: 2+    Vascular Exam     Right Pulses  Dorsalis Pedis:      2+  Posterior Tibial:      2+        Left Pulses  Dorsalis Pedis:      2+  Posterior Tibial:      2+        Edema  Right Lower Leg: absent  Left Lower Leg: absent              Assessment:       Encounter Diagnoses   Name Primary?    Wound dehiscence Yes    BMI 39.0-39.9,adult     Achilles tendon rupture, right, sequela           Plan:       1. Ankle/foot function, SF-12 was filled out today in clinic.     RTC in 6 weeks Patient will fill out Ankle/foot function, SF-12 on return.    2. Continue PT and progress as tolerated    3. Patient to come for reevaluation for clearance for work

## 2017-03-06 NOTE — LETTER
Patient: Anjelica Vizcaino    YOB: 1980   Clinic Number: [id number]   Today's Date: March 6, 2017        Certificate to Return to Work     Anjelica was seen by Princess Bai MD on 3/6/2017.    Return in about 3 months (around 6/6/2017) for RTC in 3 months. Patient will fill out Ankle/foot function, SF-12 on return.. Anjelica will be seen by Dr. Princess Bai.    Anjelica can return to work on 4/21/17 with full duty.    Specific restrictions:     If you have any questions or concerns, please feel free to contact the office at [phone].    Thank you.    Princess Bai MD        Signature: __________________________________________________

## 2017-03-08 NOTE — PROGRESS NOTES
"                                                  Physical Therapy Daily Note     Date: 03/08/2017  Name: Anjelica Vizcaino  Clinic Number: [id number]  Diagnosis:   Encounter Diagnosis   Name Primary?    Acute right ankle pain Yes     Diagnosis: OPERATIVE PROCEDURE PERFORMED:  1. Right complex Achilles tendon repair.  2. Right placental graft application    Physician: Rex Lange*    Evaluation Date: 1/16/17  Date of Surgery: 12/8/16  Visit #: 10  Start Time:  2:30  Stop Time:  3:30  Total Treatment Time: 60    Precautions: WEIGHTBEARING STATUS: Nonweightbearing for 6 weeks, partial weightbearing at   6-7 weeks and full weightbearing by 7-8 weeks with fracture walking boot in   place with ambulation at 6-7 and 8 weeks' time.      Following 6 weeks to 8 weeks protection, the patient could be advanced to a   standard Achilles tendon repair protocol, but should avoid high impact type   loads approximately 4-6 months following surgery.    Subjective     Pt states the ankle is doing much better.     Pain: 0/10    Objective     Measurements taken: none  FOTO: 35% 2/6/17  Patient received individual therapy to increase strength, endurance and ROM with activities as follows:     Anjelica received therapeutic exercises to develop strength, endurance and ROM for 40 minutes including:   SL bridges 3x10  Clamshells 3x10  Shuttle DL 4c 3x10  Shuttle SL 2.5c 3x10  Shuttle 2-1 2c 3x10  6 in step ups 3x10  6 in step downs 3x10  Wall sits 3x30"  SL RDLs 2x10  SL shoulder taps on wall 2x20  Pilates SLS 3x10  SLS 3x10  Lunge walks x 2 laps      Anjelica received the following manual therapy techniques: Joint mobilizations and Soft tissue Mobilization were applied to the: right ankle for 15 minutes. NP  - PROM: all  - posterior talus mobs    Pt received wound care from wound care specialist PT.     Written Home Exercises Provided: updated as per therex list  Pt demo good understanding of the education provided. Anjelica demonstrated " good return demonstration of activities.     Education provided:  Anjelica verbalized good understanding of education provided.   No spiritual or educational barriers to learning identified.    Assessment     Closed chain stability and eccentric control continue to improve with rx. Continue to progress single leg squat loading mechanics.     This is a 36 y.o. male referred to outpatient physical therapy and presents with a medical diagnosis of right ankle pain and demonstrates limitations as described in the problem list Pt prognosis is Good. Pt will continue to benefit from skilled outpatient physical therapy to address the deficits listed below in the problem list, provide pt/family education and to maximize pt's level of independence in the home and community environment.    Will the patient continue to benefit from skilled PT intervention? yes        Medical necessity is demonstrated by:   - Pain limits function of effected part for all activities  - Unable to participate in daily activities   - Requires skilled supervision to complete and progress HEP  - Fall risk - impaired balance   - Continued inability to participate in vocational pursuits    Short Term Goals (9  Weeks):  - Pt will increase ROM of right ankle = left.  - Pt will increase strength of bilateral hip abd = 5/5  - Decrease Pain to 1/10 with ADLs  - Pt to self correct posture independently with normalized gait pattern.   - Pt independent with HEP with progressions.      Long Term Goals (18 Weeks):  - Pt with normalized SLS mechanics x 2 min  - Pt with 94% Ybalance scores  - Decrease Pain to 0/10 with jogging, boxing  - Pt to return to boxing without pain.       Plan   Continue with established Plan of Care towards PT goals.      Therapist: ALESIA SMALLWOOD, PT

## 2017-03-13 ENCOUNTER — CLINICAL SUPPORT (OUTPATIENT)
Dept: REHABILITATION | Facility: HOSPITAL | Age: 37
End: 2017-03-13
Attending: ORTHOPAEDIC SURGERY
Payer: COMMERCIAL

## 2017-03-13 DIAGNOSIS — M25.571 ACUTE RIGHT ANKLE PAIN: Primary | ICD-10-CM

## 2017-03-13 PROCEDURE — 97110 THERAPEUTIC EXERCISES: CPT | Performed by: PHYSICAL THERAPIST

## 2017-03-13 NOTE — PROGRESS NOTES
"                                                  Physical Therapy Daily Note     Date: 03/13/2017  Name: Anjelica Vizcaino  Clinic Number: [id number]  Diagnosis:   Encounter Diagnosis   Name Primary?    Acute right ankle pain Yes     Diagnosis: OPERATIVE PROCEDURE PERFORMED:  1. Right complex Achilles tendon repair.  2. Right placental graft application    Physician: Rex Lange*    Evaluation Date: 1/16/17  Date of Surgery: 12/8/16  Visit #: 11  Start Time:  9:30  Stop Time:  10:30  Total Treatment Time: 60    Precautions: WEIGHTBEARING STATUS: Nonweightbearing for 6 weeks, partial weightbearing at   6-7 weeks and full weightbearing by 7-8 weeks with fracture walking boot in   place with ambulation at 6-7 and 8 weeks' time.      Following 6 weeks to 8 weeks protection, the patient could be advanced to a   standard Achilles tendon repair protocol, but should avoid high impact type   loads approximately 4-6 months following surgery.    Subjective     Pt states the ankle is doing well. I still can do a single leg calf raise just yet.     Pain: 0/10    Objective     Measurements taken: none  FOTO: 35% 2/6/17  Patient received individual therapy to increase strength, endurance and ROM with activities as follows:     Anjelica received therapeutic exercises to develop strength, endurance and ROM for 40 minutes including:   SL bridges 3x10  Clamshells 3x10  Shuttle DL 4c 3x10  Shuttle SL 2.5c 3x10  Shuttle 2-1 2c 3x10  6 in step ups 3x10  6 in step downs 3x10  Wall sits 3x30"  SL RDLs 2x10  SL shoulder taps on wall 2x20  Pilates SLS 3x10  SLS 3x10  Lunge walks x 2 laps  Shuttle calf raises 2-1 x30  6 in step calf raises 2-1 x30  Split squat calf raises 2-1 x 30      Anjelica received the following manual therapy techniques: Joint mobilizations and Soft tissue Mobilization were applied to the: right ankle for 15 minutes. NP  - PROM: all  - posterior talus mobs    Pt received wound care from wound care specialist PT. "     Written Home Exercises Provided: updated as per therex list  Pt demo good understanding of the education provided. Anjelica demonstrated good return demonstration of activities.     Education provided:  Anjelica verbalized good understanding of education provided.   No spiritual or educational barriers to learning identified.    Assessment     Single leg stability and eccentric gastroc strength continue to improve with rx.     This is a 36 y.o. male referred to outpatient physical therapy and presents with a medical diagnosis of right ankle pain and demonstrates limitations as described in the problem list Pt prognosis is Good. Pt will continue to benefit from skilled outpatient physical therapy to address the deficits listed below in the problem list, provide pt/family education and to maximize pt's level of independence in the home and community environment.    Will the patient continue to benefit from skilled PT intervention? yes        Medical necessity is demonstrated by:   - Pain limits function of effected part for all activities  - Unable to participate in daily activities   - Requires skilled supervision to complete and progress HEP  - Fall risk - impaired balance   - Continued inability to participate in vocational pursuits    Short Term Goals (9  Weeks):  - Pt will increase ROM of right ankle = left.  - Pt will increase strength of bilateral hip abd = 5/5  - Decrease Pain to 1/10 with ADLs  - Pt to self correct posture independently with normalized gait pattern.   - Pt independent with HEP with progressions.      Long Term Goals (18 Weeks):  - Pt with normalized SLS mechanics x 2 min  - Pt with 94% Ybalance scores  - Decrease Pain to 0/10 with jogging, boxing  - Pt to return to boxing without pain.       Plan   Continue with established Plan of Care towards PT goals.      Therapist: ALESIA SMALLWOOD, PT

## 2017-03-20 ENCOUNTER — CLINICAL SUPPORT (OUTPATIENT)
Dept: REHABILITATION | Facility: HOSPITAL | Age: 37
End: 2017-03-20
Attending: ORTHOPAEDIC SURGERY
Payer: COMMERCIAL

## 2017-03-20 DIAGNOSIS — M25.571 ACUTE RIGHT ANKLE PAIN: Primary | ICD-10-CM

## 2017-03-20 PROCEDURE — 97110 THERAPEUTIC EXERCISES: CPT | Performed by: PHYSICAL THERAPIST

## 2017-03-27 ENCOUNTER — CLINICAL SUPPORT (OUTPATIENT)
Dept: REHABILITATION | Facility: HOSPITAL | Age: 37
End: 2017-03-27
Attending: ORTHOPAEDIC SURGERY
Payer: COMMERCIAL

## 2017-03-27 DIAGNOSIS — M25.571 ACUTE RIGHT ANKLE PAIN: Primary | ICD-10-CM

## 2017-03-27 PROCEDURE — 97110 THERAPEUTIC EXERCISES: CPT | Performed by: PHYSICAL THERAPIST

## 2017-03-27 NOTE — PROGRESS NOTES
"                                                  Physical Therapy Daily Note     Date: 03/27/2017  Name: Anjelica Vizcaino  Clinic Number: [id number]  Diagnosis:   Encounter Diagnosis   Name Primary?    Acute right ankle pain Yes     Diagnosis: OPERATIVE PROCEDURE PERFORMED:  1. Right complex Achilles tendon repair.  2. Right placental graft application    Physician: Rex Lange*    Evaluation Date: 1/16/17  Date of Surgery: 12/8/16  Visit #: 13  Start Time:  9:30  Stop Time:  10:30  Total Treatment Time: 60    Precautions: WEIGHTBEARING STATUS: Nonweightbearing for 6 weeks, partial weightbearing at   6-7 weeks and full weightbearing by 7-8 weeks with fracture walking boot in   place with ambulation at 6-7 and 8 weeks' time.      Following 6 weeks to 8 weeks protection, the patient could be advanced to a   standard Achilles tendon repair protocol, but should avoid high impact type   loads approximately 4-6 months following surgery.    Subjective     Pt states the calf is getting stronger.     Pain: 0/10    Objective     Measurements taken: none  FOTO: 35% 2/6/17  Patient received individual therapy to increase strength, endurance and ROM with activities as follows:     Anjelica received therapeutic exercises to develop strength, endurance and ROM for 40 minutes including:   SL bridges 3x10  Clamshells 3x10  Shuttle DL 4c 3x10  Shuttle SL 2.5c 3x10  Shuttle 2-1 2c 3x10  6 in step ups 3x10  6 in step downs 3x10  Wall sits 3x30"  SL RDLs 2x10  SL shoulder taps on wall 2x20  Pilates SLS 3x10  SLS 3x10  Lunge walks x 2 laps  Shuttle calf raises 2-1 x30  6 in step calf raises 2-1 x30  Split squat calf raises 2-1 x 30  Power calf raises x 30      Anjelica received the following manual therapy techniques: Joint mobilizations and Soft tissue Mobilization were applied to the: right ankle for 15 minutes. NP  - PROM: all  - posterior talus mobs    Pt received wound care from wound care specialist PT.     Written Home " Exercises Provided: updated as per therex list  Pt demo good understanding of the education provided. Anjelica demonstrated good return demonstration of activities.     Education provided:  Anjelica verbalized good understanding of education provided.   No spiritual or educational barriers to learning identified.    Assessment     Improving gastroc/soleus strength. Improving functional hip strength as well.     This is a 36 y.o. male referred to outpatient physical therapy and presents with a medical diagnosis of right ankle pain and demonstrates limitations as described in the problem list Pt prognosis is Good. Pt will continue to benefit from skilled outpatient physical therapy to address the deficits listed below in the problem list, provide pt/family education and to maximize pt's level of independence in the home and community environment.    Will the patient continue to benefit from skilled PT intervention? yes        Medical necessity is demonstrated by:   - Pain limits function of effected part for all activities  - Unable to participate in daily activities   - Requires skilled supervision to complete and progress HEP  - Fall risk - impaired balance   - Continued inability to participate in vocational pursuits    Short Term Goals (9  Weeks):  - Pt will increase ROM of right ankle = left.  - Pt will increase strength of bilateral hip abd = 5/5  - Decrease Pain to 1/10 with ADLs  - Pt to self correct posture independently with normalized gait pattern.   - Pt independent with HEP with progressions.      Long Term Goals (18 Weeks):  - Pt with normalized SLS mechanics x 2 min  - Pt with 94% Ybalance scores  - Decrease Pain to 0/10 with jogging, boxing  - Pt to return to boxing without pain.       Plan   Continue with established Plan of Care towards PT goals.      Therapist: ALESIA SMALLWOOD, PT

## 2017-04-17 ENCOUNTER — CLINICAL SUPPORT (OUTPATIENT)
Dept: REHABILITATION | Facility: HOSPITAL | Age: 37
End: 2017-04-17
Attending: ORTHOPAEDIC SURGERY
Payer: COMMERCIAL

## 2017-04-17 ENCOUNTER — OFFICE VISIT (OUTPATIENT)
Dept: SPORTS MEDICINE | Facility: CLINIC | Age: 37
End: 2017-04-17
Payer: COMMERCIAL

## 2017-04-17 VITALS
SYSTOLIC BLOOD PRESSURE: 123 MMHG | BODY MASS INDEX: 40.18 KG/M2 | WEIGHT: 250 LBS | HEART RATE: 85 BPM | DIASTOLIC BLOOD PRESSURE: 88 MMHG | HEIGHT: 66 IN

## 2017-04-17 DIAGNOSIS — M25.571 CHRONIC PAIN OF RIGHT ANKLE: ICD-10-CM

## 2017-04-17 DIAGNOSIS — E66.09 NON MORBID OBESITY DUE TO EXCESS CALORIES: ICD-10-CM

## 2017-04-17 DIAGNOSIS — M25.571 ACUTE RIGHT ANKLE PAIN: Primary | ICD-10-CM

## 2017-04-17 DIAGNOSIS — G89.29 CHRONIC PAIN OF RIGHT ANKLE: ICD-10-CM

## 2017-04-17 PROBLEM — T81.30XA WOUND DEHISCENCE: Status: RESOLVED | Noted: 2017-01-18 | Resolved: 2017-04-17

## 2017-04-17 PROCEDURE — 97110 THERAPEUTIC EXERCISES: CPT | Mod: S$GLB,,, | Performed by: ORTHOPAEDIC SURGERY

## 2017-04-17 PROCEDURE — 99999 PR PBB SHADOW E&M-EST. PATIENT-LVL III: CPT | Mod: PBBFAC,,, | Performed by: ORTHOPAEDIC SURGERY

## 2017-04-17 PROCEDURE — 97110 THERAPEUTIC EXERCISES: CPT | Performed by: PHYSICAL THERAPIST

## 2017-04-17 PROCEDURE — 1160F RVW MEDS BY RX/DR IN RCRD: CPT | Mod: S$GLB,,, | Performed by: ORTHOPAEDIC SURGERY

## 2017-04-17 PROCEDURE — 99214 OFFICE O/P EST MOD 30 MIN: CPT | Mod: S$GLB,,, | Performed by: ORTHOPAEDIC SURGERY

## 2017-04-17 NOTE — PROGRESS NOTES
"                                                  Physical Therapy Daily Note     Date: 04/17/2017  Name: Anjelica Vizcaino  Clinic Number: [id number]  Diagnosis:   Encounter Diagnosis   Name Primary?    Acute right ankle pain Yes     Diagnosis: OPERATIVE PROCEDURE PERFORMED:  1. Right complex Achilles tendon repair.  2. Right placental graft application    Physician: Rex Lange*    Evaluation Date: 1/16/17  Date of Surgery: 12/8/16  Visit #: 14  Start Time:  11:30  Stop Time:  12:30  Total Treatment Time: 60    Precautions: WEIGHTBEARING STATUS: Nonweightbearing for 6 weeks, partial weightbearing at   6-7 weeks and full weightbearing by 7-8 weeks with fracture walking boot in   place with ambulation at 6-7 and 8 weeks' time.      Following 6 weeks to 8 weeks protection, the patient could be advanced to a   standard Achilles tendon repair protocol, but should avoid high impact type   loads approximately 4-6 months following surgery.    Subjective     Pt states the leg and calf are getting better.     Pain: 0/10    Objective     Measurements taken: none  FOTO: 35% 2/6/17  Patient received individual therapy to increase strength, endurance and ROM with activities as follows:     Anjelica received therapeutic exercises to develop strength, endurance and ROM for 40 minutes including:   SL bridges 3x10  Clamshells 3x10  Shuttle DL 4c 3x10  Shuttle SL 2.5c 3x10  Shuttle 2-1 2c 3x10  6 in step ups 3x10  6 in step downs 3x10  Wall sits 3x30"  SL RDLs 2x10  SL shoulder taps on wall 2x20  Pilates SLS 3x10  SLS 3x10  Lunge walks x 2 laps  Shuttle calf raises 2-1 x30  6 in step calf raises 2-1 x30  Split squat calf raises 2-1 x 30  Power calf raises x 30      Anjelica received the following manual therapy techniques: Joint mobilizations and Soft tissue Mobilization were applied to the: right ankle for 15 minutes. NP  - PROM: all  - posterior talus mobs    Pt received wound care from wound care specialist PT.     Written " Home Exercises Provided: updated as per therex list  Pt demo good understanding of the education provided. Anjelica demonstrated good return demonstration of activities.     Education provided:  Anjelica verbalized good understanding of education provided.   No spiritual or educational barriers to learning identified.    Assessment     Single leg stability and eccentric quad control continue to improve with rx. Continue to progress as tolerated.     This is a 36 y.o. male referred to outpatient physical therapy and presents with a medical diagnosis of right ankle pain and demonstrates limitations as described in the problem list Pt prognosis is Good. Pt will continue to benefit from skilled outpatient physical therapy to address the deficits listed below in the problem list, provide pt/family education and to maximize pt's level of independence in the home and community environment.    Will the patient continue to benefit from skilled PT intervention? yes        Medical necessity is demonstrated by:   - Pain limits function of effected part for all activities  - Unable to participate in daily activities   - Requires skilled supervision to complete and progress HEP  - Fall risk - impaired balance   - Continued inability to participate in vocational pursuits    Short Term Goals (9  Weeks):  - Pt will increase ROM of right ankle = left.  - Pt will increase strength of bilateral hip abd = 5/5  - Decrease Pain to 1/10 with ADLs  - Pt to self correct posture independently with normalized gait pattern.   - Pt independent with HEP with progressions.      Long Term Goals (18 Weeks):  - Pt with normalized SLS mechanics x 2 min  - Pt with 94% Ybalance scores  - Decrease Pain to 0/10 with jogging, boxing  - Pt to return to boxing without pain.       Plan   Continue with established Plan of Care towards PT goals.      Therapist: ALESIA SMALLWOOD, PT

## 2017-04-17 NOTE — LETTER
Patient: Anjelica Vizcaino   YOB: 1980   Clinic Number: [id number]   Today's Date: April 17, 2017        Certificate to Return to Work     Anjelica Jiménez was seen by Princess Bai MD on 4/17/2017.    Return in about 3 months (around 7/17/2017) for RTC in 3 months. Patient will fill out Ankle/foot function, SF-12 on return.. Anjelica Jiménez will be seen by Dr. Princess Bai.    Anjelica Jiménez can return to work on 4/24/2017 with full duty.    Specific restrictions: continue PT for strengthening and function, rest and sitting as needed during the day    If you have any questions or concerns, please feel free to contact the office at [phone].    Thank you.    Princess Bai MD        Signature: __________________________________________________

## 2017-04-17 NOTE — PROGRESS NOTES
Subjective:          Chief Complaint: Anjelica Vizcaino is a 36 y.o. male who had concerns including Pain of the Right Ankle.    HPI Comments: Patient is here for a follow up for his right achilles. He has been doing PT with Jamaal. He missed the last two weeks but is going today. He is not doing much exercise on his own. He is not back at work currently. He works at a  and does a lot of walking.       DATE OF PROCEDURE: 12/08/2016     ATTENDING SURGEON: Princess Bai M.D.     FIRST ASSISTANT: Rex Lange MD - fellow.     SECOND ASSISTANT: SMA Alfreda - assistant.     PREOPERATIVE DIAGNOSIS: Right Achilles tendon tear.     POSTOPERATIVE DIAGNOSIS: Right Achilles tendon tear (complex high-grade   interstitial).     OPERATIVE PROCEDURE PERFORMED:  1. Right complex Achilles tendon repair.  2. Right placental graft application. CPT code 53979, reference CPT code   49786.    Handedness: right-handed  Sport played:    Level:            Pain   Pertinent negatives include no chest pain, chills, diaphoresis, fever, joint swelling, myalgias, nausea, numbness, rash, vomiting or weakness.   Ankle Pain    This is a new problem. The current episode started in the past 7 days. There has been a history of trauma. Pertinent negatives include no fever, numbness or stiffness. There is no history of gout.       Review of Systems   Constitution: Negative for chills, decreased appetite, diaphoresis, fever, weakness, malaise/fatigue, night sweats, weight gain and weight loss.   HENT: Negative for hearing loss.    Eyes: Negative for blurred vision, double vision, pain and visual disturbance.   Cardiovascular: Negative for chest pain, cyanosis, dyspnea on exertion, leg swelling, orthopnea and palpitations.   Respiratory: Negative for hemoptysis, shortness of breath and wheezing.    Endocrine: Negative for polyuria.   Hematologic/Lymphatic: Negative for bleeding problem.   Skin: Negative for color change and  rash.   Musculoskeletal: Positive for joint pain. Negative for arthritis, back pain, falls, gout, joint swelling, muscle cramps, muscle weakness, myalgias and stiffness.   Gastrointestinal: Negative for hematemesis, hematochezia, melena, nausea and vomiting.   Genitourinary: Negative for dysuria, frequency and hematuria.   Neurological: Negative for dizziness, light-headedness, loss of balance, numbness and paresthesias.   Psychiatric/Behavioral: Negative for altered mental status, depression and memory loss. The patient does not have insomnia and is not nervous/anxious.        Pain Related Questions  Over the past 3 days, what was your average pain during activity? (I.e. running, jogging, walking, climbing stairs, getting dressed, ect.): 0  Over the past 3 days, what was your lowest pain level? : 0    Other  How many nights a week are you awakened by your affected body part?: 0  Was the patient's HEIGHT measured or patient reported?: Patient Reported      Objective:        General: Anjelica is well-developed, well-nourished, appears stated age, in no acute distress, alert and oriented to time, place and person.     General    Vitals reviewed.  Constitutional: He is oriented to person, place, and time. He appears well-developed and well-nourished. No distress.   HENT:   Mouth/Throat: No oropharyngeal exudate.   Eyes: Right eye exhibits no discharge. Left eye exhibits no discharge.   Neck: Normal range of motion.   Cardiovascular: Normal rate.    Pulmonary/Chest: Breath sounds normal. No respiratory distress.   Neurological: He is alert and oriented to person, place, and time. He has normal reflexes. No cranial nerve deficit. Coordination normal.   Psychiatric: He has a normal mood and affect. His behavior is normal. Judgment and thought content normal.     General Musculoskeletal Exam   Gait: normal     Right Ankle/Foot Exam     Inspection   Scars: present  Deformity: absent  Erythema: absent  Bruising: Ankle - absent  Foot - absent  Effusion: Ankle - absent Foot - absent  Atrophy: Ankle - absent Foot - absent    Swelling   The patient is swollen on the Achilles tendon.    Tenderness   The patient is tender to palpation of the Achilles tendon.    Range of Motion   Ankle Joint   Dorsiflexion:  10 normal   Plantar flexion:  35 normal   Subtalar Joint   Inversion:  15 normal   Eversion:  5 normal   Burrows Test:  positive  First MTP Joint: normal    Alignment   Knee Alignment: neutral  Hindfoot Alignment: neutral  Midfoot Alignment: normal  Forefoot Alignment: normal    Tests   Anterior drawer: 1+  Varus tilt: 1+  Heel Walk: able to perform  Tiptoe Walk: unable to perform  Single Heel Rise: unable to perform  External Rotation Test: negative  Squeeze Test: negative    Other   Ankle Crepitus: absent  Foot Crepitus:  absent  Sensation: normal  Peroneal Subluxation: negative    Comments:  Wound healed no signs of infection   Unable to preform single leg toe rise     Left Ankle/Foot Exam     Inspection  Deformity: absent  Erythema: absent  Bruising: Ankle - absent Foot - absent  Effusion: Ankle - absent Foot - absent  Atrophy: Ankle - absent Foot - absent  Scars: absent    Range of Motion   Ankle Joint  Dorsiflexion:  10 normal   Plantar flexion:  35 normal     Subtalar Joint   Inversion:  15 normal   Eversion:  5 normal   Burrows Test:  normal  First MTP Joint: normal    Alignment   Knee Alignment: neutral  Hindfoot Alignment: neutral  Midfoot Alignment: normal  Forefoot Alignment: normal    Tests   Anterior drawer: 1+  Varus tilt: 1+  Heel Walk: able to perform  Tiptoe Walk: able to perform  Single Heel Rise: able to perform  External Rotation Test: negative  Squeeze Test: absent    Other   Foot Crepitus:  absent  Ankle Crepitus: absent  Sensation: normal  Peroneal Subluxation: negative      Muscle Strength   Right Lower Extremity   Anterior tibial:  5/5/5  Posterior tibial:  5/5/5  Gastrocsoleus:  4/5/5  Peroneal muscle:   5/5/5  EHL:  5/5  FDL: 5/5  EDL: 5/5  FHL: 5/5  Left Lower Extremity   Anterior tibial:  5/5/5   Posterior tibial:  5/5/5  Gastrocsoleus:  5/5/5  Peroneal muscle:  5/5/5  EHL:  5/5  FDL: 5/5  EDL: 5/5  FHL: 5/5    Reflexes     Left Side  Post Tibial:  2+  Achilles:  2+  Plantar Reflex: 2+    Right Side   Post Tibial:  2+  Achilles:  2+  Plantar Reflex: 2+    Vascular Exam     Right Pulses  Dorsalis Pedis:      2+  Posterior Tibial:      2+        Left Pulses  Dorsalis Pedis:      2+  Posterior Tibial:      2+        Edema  Right Lower Leg: absent  Left Lower Leg: absent              Assessment:       Encounter Diagnoses   Name Primary?    BMI 39.0-39.9,adult Yes    Non morbid obesity due to excess calories           Plan:       1. Ankle/foot function, SF-12 was filled out today in clinic.     RTC in 3 months. Patient will fill out Ankle/foot function, SF-12 on return.    2. Continue PT and advance with activities - new referral placed today    3. Return to work at this time - letter provided

## 2017-04-17 NOTE — MR AVS SNAPSHOT
[facility]  [address]  [facility] [address]  Phone: [phone]                  Anjelica Vizcaino   2017 9:15 AM   Office Visit    Description:  Male : 1980   Provider:  Princess Bai MD   Department:  [facility]           Reason for Visit     Right Ankle - Pain           Diagnoses this Visit        Comments    BMI 39.0-39.9,adult    -  Primary     Non morbid obesity due to excess calories         Chronic pain of right ankle                To Do List           Future Appointments        Provider Department Dept Phone    2017 11:30 AM Jamaal Bills, PT [facility] [phone]      Goals (5 Years of Data)     None      Follow-Up and Disposition     Return in about 3 months (around 2017) for RTC in 3 months. Patient will fill out Ankle/foot function, SF-12 on return..      [facility]     [facility] Nurse Care Line -  Assistance  Unless otherwise directed by your provider, please contact [facility], our nurse care line that is available for  assistance.     Registered nurses in the [facility] provide: appointment scheduling, clinical advisement, health education, and other advisory services.  Call: [phone] (toll free)               Medications           Message regarding Medications     Verify the changes and/or additions to your medication regime listed below are the same as discussed with your clinician today.  If any of these changes or additions are incorrect, please notify your healthcare provider.             Verify that the below list of medications is an accurate representation of the medications you are currently taking.  If none reported, the list may be blank. If incorrect, please contact your healthcare provider. Carry this list with you in case of emergency.           Current Medications     aspirin 325 MG tablet Take 1 tablet (325 mg total) by mouth once daily.     "ibuprofen (ADVIL,MOTRIN) 200 MG tablet Take 200 mg by mouth every 6 (six) hours as needed for Pain.    oxycodone-acetaminophen (PERCOCET)  mg per tablet Take 1 tablet by mouth every 4 (four) hours as needed for Pain.    promethazine (PHENERGAN) 25 MG tablet Take 1 tablet (25 mg total) by mouth every 4 (four) hours.           Clinical Reference Information           Your Vitals Were     BP Pulse Height Weight BMI    123/88 85 5' 6" (1.676 m) 113.4 kg (250 lb) 40.35 kg/m2      Blood Pressure          Most Recent Value    BP  123/88      Allergies as of 4/17/2017     No Known Allergies      Immunizations Administered on Date of Encounter - 4/17/2017     None      Orders Placed During Today's Visit      Normal Orders This Visit    Ambulatory Referral to Physical/Occupational Therapy       Instructions            Goal to play basketball and boxing; recommend low impact program for 4-6 weeks then transition to jogging at 3 months from today and sports-specific 4-6 months from today       Language Assistance Services     ATTENTION: Language assistance services are available, free of charge. Please call [phone].      ATENCIÓN: Si samir demarco, tiene a deutsch disposición servicios gratuitos de asistencia lingüística. Llame al [phone].     YOGESH Ý: N?u b?n nói Ti?ng Vi?t, có các d?ch v? h? tr? ngôn ng? mi?n phí dành cho b?n. G?i s? [phone].         [facility] complies with applicable Federal civil rights laws and does not discriminate on the basis of race, color, national origin, age, disability, or sex.        "

## 2017-04-17 NOTE — PATIENT INSTRUCTIONS
Goal to play basketball and boxing; recommend low impact program for 4-6 weeks then transition to jogging at 3 months from today and sports-specific 4-6 months from today

## 2017-04-24 ENCOUNTER — CLINICAL SUPPORT (OUTPATIENT)
Dept: REHABILITATION | Facility: HOSPITAL | Age: 37
End: 2017-04-24
Attending: ORTHOPAEDIC SURGERY
Payer: COMMERCIAL

## 2017-04-24 DIAGNOSIS — M25.571 ACUTE RIGHT ANKLE PAIN: Primary | ICD-10-CM

## 2017-04-24 PROCEDURE — 97110 THERAPEUTIC EXERCISES: CPT | Performed by: PHYSICAL THERAPIST

## 2017-04-24 NOTE — PROGRESS NOTES
"                                                  Physical Therapy Daily Note     Date: 04/24/2017  Name: Anjelica Vizcaino  Clinic Number: [id number]  Diagnosis:   Encounter Diagnosis   Name Primary?    Acute right ankle pain Yes     Diagnosis: OPERATIVE PROCEDURE PERFORMED:  1. Right complex Achilles tendon repair.  2. Right placental graft application    Physician: Princess Bai MD    Evaluation Date: 1/16/17  Date of Surgery: 12/8/16  Visit #: 15  Start Time:  4:30  Stop Time:  5:30  Total Treatment Time: 60    Precautions: WEIGHTBEARING STATUS: Nonweightbearing for 6 weeks, partial weightbearing at   6-7 weeks and full weightbearing by 7-8 weeks with fracture walking boot in   place with ambulation at 6-7 and 8 weeks' time.      Following 6 weeks to 8 weeks protection, the patient could be advanced to a   standard Achilles tendon repair protocol, but should avoid high impact type   loads approximately 4-6 months following surgery.    Subjective     Pt states the leg is getting stronger.    Pain: 0/10    Objective     Measurements taken: none  FOTO: 35% 2/6/17  Patient received individual therapy to increase strength, endurance and ROM with activities as follows:     Anjelica received therapeutic exercises to develop strength, endurance and ROM for 40 minutes including:   SL bridges 3x10  Clamshells 3x10  Shuttle DL 4c 3x10  Shuttle SL 2.5c 3x10  Shuttle 2-1 2c 3x10  6 in step ups 3x10  6 in step downs 3x10  Wall sits 3x30"  SL RDLs 2x10  SL shoulder taps on wall 2x20  Pilates SLS 3x10  SLS 3x10  Lunge walks x 2 laps  Shuttle calf raises 2-1 x30  6 in step calf raises 2-1 x30  Split squat calf raises 2-1 x 30  Power calf raises x 30  In and outs/crossovers x 5      Anjelica received the following manual therapy techniques: Joint mobilizations and Soft tissue Mobilization were applied to the: right ankle for 15 minutes. NP  - PROM: all  - posterior talus mobs    Pt received wound care from wound care specialist PT. "     Written Home Exercises Provided: updated as per therex list  Pt demo good understanding of the education provided. Anjelica demonstrated good return demonstration of activities.     Education provided:  Anjelica verbalized good understanding of education provided.   No spiritual or educational barriers to learning identified.    Assessment     Single leg eccentric control and single leg dynamic control continue to improve with rx.     This is a 36 y.o. male referred to outpatient physical therapy and presents with a medical diagnosis of right ankle pain and demonstrates limitations as described in the problem list Pt prognosis is Good. Pt will continue to benefit from skilled outpatient physical therapy to address the deficits listed below in the problem list, provide pt/family education and to maximize pt's level of independence in the home and community environment.    Will the patient continue to benefit from skilled PT intervention? yes        Medical necessity is demonstrated by:   - Pain limits function of effected part for all activities  - Unable to participate in daily activities   - Requires skilled supervision to complete and progress HEP  - Fall risk - impaired balance   - Continued inability to participate in vocational pursuits    Short Term Goals (9  Weeks):  - Pt will increase ROM of right ankle = left.  - Pt will increase strength of bilateral hip abd = 5/5  - Decrease Pain to 1/10 with ADLs  - Pt to self correct posture independently with normalized gait pattern.   - Pt independent with HEP with progressions.      Long Term Goals (18 Weeks):  - Pt with normalized SLS mechanics x 2 min  - Pt with 94% Ybalance scores  - Decrease Pain to 0/10 with jogging, boxing  - Pt to return to boxing without pain.       Plan   Continue with established Plan of Care towards PT goals.      Therapist: ALESIA SMALLWOOD, PT

## 2017-05-01 ENCOUNTER — CLINICAL SUPPORT (OUTPATIENT)
Dept: REHABILITATION | Facility: HOSPITAL | Age: 37
End: 2017-05-01
Attending: ORTHOPAEDIC SURGERY
Payer: COMMERCIAL

## 2017-05-01 DIAGNOSIS — M25.571 ACUTE RIGHT ANKLE PAIN: Primary | ICD-10-CM

## 2017-05-01 PROCEDURE — 97110 THERAPEUTIC EXERCISES: CPT | Performed by: PHYSICAL THERAPIST

## 2017-05-01 NOTE — PROGRESS NOTES
"                                                  Physical Therapy Daily Note     Date: 05/01/2017  Name: Anjelica Vizcaino  Clinic Number: [id number]  Diagnosis:   Encounter Diagnosis   Name Primary?    Acute right ankle pain Yes     Diagnosis: OPERATIVE PROCEDURE PERFORMED:  1. Right complex Achilles tendon repair.  2. Right placental graft application    Physician: Rex Lange*    Evaluation Date: 1/16/17  Date of Surgery: 12/8/16  Visit #: 16  Start Time:  11:30  Stop Time:  12:30  Total Treatment Time: 60    Precautions: WEIGHTBEARING STATUS: Nonweightbearing for 6 weeks, partial weightbearing at   6-7 weeks and full weightbearing by 7-8 weeks with fracture walking boot in   place with ambulation at 6-7 and 8 weeks' time.      Following 6 weeks to 8 weeks protection, the patient could be advanced to a   standard Achilles tendon repair protocol, but should avoid high impact type   loads approximately 4-6 months following surgery.    Subjective     Pt states the leg is feeling good. I am ready to keep progressing.     Pain: 0/10    Objective     Measurements taken: none  FOTO: 35% 2/6/17  Patient received individual therapy to increase strength, endurance and ROM with activities as follows:     Anjelica received therapeutic exercises to develop strength, endurance and ROM for 45 minutes including:   SL bridges 3x10  Clamshells 3x10  Shuttle DL 4c 3x10  Shuttle SL 2.5c 3x10  Shuttle 2-1 2c 3x10  6 in step ups 3x10  6 in step downs 3x10  Wall sits 3x30"  SL RDLs 2x10  SL shoulder taps on wall 2x20  Pilates SLS 3x10  SLS 3x10  Lunge walks x 2 laps  Shuttle calf raises 2-1 x30  6 in step calf raises 2-1 x30  Split squat calf raises 2-1 x 30  Power calf raises x 30  In and outs/crossovers x 5  TM jogging x 2 min with Metronome at 180 bpm    Anjelica received the following manual therapy techniques: Joint mobilizations and Soft tissue Mobilization were applied to the: right ankle for 15 minutes. NP  - PROM: all  - " posterior talus mobs    Pt received wound care from wound care specialist PT.     Written Home Exercises Provided: updated as per therex list  Pt demo good understanding of the education provided. Anjelica demonstrated good return demonstration of activities.     Education provided:  Anjelica verbalized good understanding of education provided.   No spiritual or educational barriers to learning identified.    Assessment     Excellent jogging mechanics with metronome on TM. Continue to progress eccentric control and endurance with jogging/dynamic therex.    This is a 36 y.o. male referred to outpatient physical therapy and presents with a medical diagnosis of right ankle pain and demonstrates limitations as described in the problem list Pt prognosis is Good. Pt will continue to benefit from skilled outpatient physical therapy to address the deficits listed below in the problem list, provide pt/family education and to maximize pt's level of independence in the home and community environment.    Will the patient continue to benefit from skilled PT intervention? yes        Medical necessity is demonstrated by:   - Pain limits function of effected part for all activities  - Unable to participate in daily activities   - Requires skilled supervision to complete and progress HEP  - Fall risk - impaired balance   - Continued inability to participate in vocational pursuits    Short Term Goals (9  Weeks):  - Pt will increase ROM of right ankle = left.  - Pt will increase strength of bilateral hip abd = 5/5  - Decrease Pain to 1/10 with ADLs  - Pt to self correct posture independently with normalized gait pattern.   - Pt independent with HEP with progressions.      Long Term Goals (18 Weeks):  - Pt with normalized SLS mechanics x 2 min  - Pt with 94% Ybalance scores  - Decrease Pain to 0/10 with jogging, boxing  - Pt to return to boxing without pain.       Plan   Continue with established Plan of Care towards PT  goals.      Therapist: ALESIA SMALLWOOD PT

## 2017-05-08 ENCOUNTER — CLINICAL SUPPORT (OUTPATIENT)
Dept: REHABILITATION | Facility: HOSPITAL | Age: 37
End: 2017-05-08
Attending: ORTHOPAEDIC SURGERY
Payer: COMMERCIAL

## 2017-05-08 DIAGNOSIS — M25.571 ACUTE RIGHT ANKLE PAIN: Primary | ICD-10-CM

## 2017-05-08 PROCEDURE — 97110 THERAPEUTIC EXERCISES: CPT | Performed by: PHYSICAL THERAPIST

## 2017-05-08 NOTE — PROGRESS NOTES
Physical Therapy Daily Note     Date: 05/08/2017  Name: Anjelica Vizcaino  Clinic Number: [id number]  Diagnosis:   Encounter Diagnosis   Name Primary?    Acute right ankle pain Yes     Diagnosis: OPERATIVE PROCEDURE PERFORMED:  1. Right complex Achilles tendon repair.  2. Right placental graft application    Physician: Rex Lange*    Evaluation Date: 1/16/17  Date of Surgery: 12/8/16  Visit #: 17  Start Time:  11:30  Stop Time:  12:30  Total Treatment Time: 60    Precautions: WEIGHTBEARING STATUS: Nonweightbearing for 6 weeks, partial weightbearing at   6-7 weeks and full weightbearing by 7-8 weeks with fracture walking boot in   place with ambulation at 6-7 and 8 weeks' time.      Following 6 weeks to 8 weeks protection, the patient could be advanced to a   standard Achilles tendon repair protocol, but should avoid high impact type   loads approximately 4-6 months following surgery.    Subjective     Pt states the leg is doing well. I wasn't sore from jogging last time.      Pain: 0/10    Objective     Measurements taken: none  FOTO: 35% 2/6/17  Patient received individual therapy to increase strength, endurance and ROM with activities as follows:     Anjelica received therapeutic exercises to develop strength, endurance and ROM for 45 minutes including:   SL bridges 3x10  Clamshells 3x10  Shuttle 2-1 2c 3x10  6 in step ups 3x10  6 in step downs 3x10  SLS 3x10  Lunge walks x 2 laps  Shuttle calf raises 2-1 x30  6 in step calf raises 2-1 x30  Split squat calf raises 2-1 x 30  Power calf raises x 30  In and outs/crossovers x 5  TM jogging x 5min  Broad jumps x 1 lap    Anjelica received the following manual therapy techniques: Joint mobilizations and Soft tissue Mobilization were applied to the: right ankle for 15 minutes. NP  - PROM: all  - posterior talus mobs    Pt received wound care from wound care specialist PT.     Written Home Exercises Provided: updated  as per therex list  Pt demo good understanding of the education provided. Anjelica demonstrated good return demonstration of activities.     Education provided:  Anjelica verbalized good understanding of education provided.   No spiritual or educational barriers to learning identified.    Assessment     Excellent landing mechanics with light broad jumping. Fatigued with TM jogging. Continue to progress light hopping mechanics and endurance.     This is a 36 y.o. male referred to outpatient physical therapy and presents with a medical diagnosis of right ankle pain and demonstrates limitations as described in the problem list Pt prognosis is Good. Pt will continue to benefit from skilled outpatient physical therapy to address the deficits listed below in the problem list, provide pt/family education and to maximize pt's level of independence in the home and community environment.    Will the patient continue to benefit from skilled PT intervention? yes        Medical necessity is demonstrated by:   - Pain limits function of effected part for all activities  - Unable to participate in daily activities   - Requires skilled supervision to complete and progress HEP  - Fall risk - impaired balance   - Continued inability to participate in vocational pursuits    Short Term Goals (9  Weeks):  - Pt will increase ROM of right ankle = left.  - Pt will increase strength of bilateral hip abd = 5/5  - Decrease Pain to 1/10 with ADLs  - Pt to self correct posture independently with normalized gait pattern.   - Pt independent with HEP with progressions.      Long Term Goals (18 Weeks):  - Pt with normalized SLS mechanics x 2 min  - Pt with 94% Ybalance scores  - Decrease Pain to 0/10 with jogging, boxing  - Pt to return to boxing without pain.       Plan   Continue with established Plan of Care towards PT goals.      Therapist: ALESIA SMALLWOOD, PT

## 2017-05-22 ENCOUNTER — CLINICAL SUPPORT (OUTPATIENT)
Dept: REHABILITATION | Facility: HOSPITAL | Age: 37
End: 2017-05-22
Attending: ORTHOPAEDIC SURGERY
Payer: COMMERCIAL

## 2017-05-22 DIAGNOSIS — M25.571 ACUTE RIGHT ANKLE PAIN: Primary | ICD-10-CM

## 2017-05-22 PROCEDURE — 97110 THERAPEUTIC EXERCISES: CPT | Performed by: PHYSICAL THERAPIST

## 2017-05-22 NOTE — PROGRESS NOTES
Physical Therapy Daily Note     Date: 05/22/2017  Name: Anjelica Vizcaino  Clinic Number: [id number]  Diagnosis:   Encounter Diagnosis   Name Primary?    Acute right ankle pain Yes     Diagnosis: OPERATIVE PROCEDURE PERFORMED:  1. Right complex Achilles tendon repair.  2. Right placental graft application    Physician: Rex Lange*    Evaluation Date: 1/16/17  Date of Surgery: 12/8/16  Visit #: 18  Start Time:  11:30  Stop Time:  12:30  Total Treatment Time: 60    Precautions: WEIGHTBEARING STATUS: Nonweightbearing for 6 weeks, partial weightbearing at   6-7 weeks and full weightbearing by 7-8 weeks with fracture walking boot in   place with ambulation at 6-7 and 8 weeks' time.      Following 6 weeks to 8 weeks protection, the patient could be advanced to a   standard Achilles tendon repair protocol, but should avoid high impact type   loads approximately 4-6 months following surgery.    Subjective     Pt states the leg is feeling good.     Pain: 0/10    Objective     Measurements taken: none  FOTO: 35% 2/6/17  Patient received individual therapy to increase strength, endurance and ROM with activities as follows:     Anjelica received therapeutic exercises to develop strength, endurance and ROM for 45 minutes including:   SL bridges 3x10  Clamshells 3x10  Shuttle 2-1 2c 3x10  6 in step ups 3x10  6 in step downs 3x10  SLS 3x10  Lunge walks x 2 laps  Shuttle calf raises 2-1 x30  6 in step calf raises 2-1 x30  Split squat calf raises 2-1 x 30  Power calf raises x 30  In and outs/crossovers x 5  Figure 8s x 3 min  Cone cuts with basketball dribble x10  Cone jumpers x 20   Alt shuttle jumps 1c 2x1min    Anjelica received the following manual therapy techniques: Joint mobilizations and Soft tissue Mobilization were applied to the: right ankle for 15 minutes. NP  - PROM: all  - posterior talus mobs    Pt received wound care from wound care specialist PT.     Written  Home Exercises Provided: updated as per therex list  Pt demo good understanding of the education provided. Anjelica demonstrated good return demonstration of activities.     Education provided:  Anjelica verbalized good understanding of education provided.   No spiritual or educational barriers to learning identified.    Assessment     Single leg loading continues to improve with light plyometrics and sport specifics. Continue to progress light hopping with appropriate mechanics.     This is a 36 y.o. male referred to outpatient physical therapy and presents with a medical diagnosis of right ankle pain and demonstrates limitations as described in the problem list Pt prognosis is Good. Pt will continue to benefit from skilled outpatient physical therapy to address the deficits listed below in the problem list, provide pt/family education and to maximize pt's level of independence in the home and community environment.    Will the patient continue to benefit from skilled PT intervention? yes        Medical necessity is demonstrated by:   - Pain limits function of effected part for all activities  - Unable to participate in daily activities   - Requires skilled supervision to complete and progress HEP  - Fall risk - impaired balance   - Continued inability to participate in vocational pursuits    Short Term Goals (9  Weeks):  - Pt will increase ROM of right ankle = left.  - Pt will increase strength of bilateral hip abd = 5/5  - Decrease Pain to 1/10 with ADLs  - Pt to self correct posture independently with normalized gait pattern.   - Pt independent with HEP with progressions.      Long Term Goals (18 Weeks):  - Pt with normalized SLS mechanics x 2 min  - Pt with 94% Ybalance scores  - Decrease Pain to 0/10 with jogging, boxing  - Pt to return to boxing without pain.       Plan   Continue with established Plan of Care towards PT goals.      Therapist: ALESIA SMALLWOOD, PT

## 2017-06-12 ENCOUNTER — CLINICAL SUPPORT (OUTPATIENT)
Dept: REHABILITATION | Facility: HOSPITAL | Age: 37
End: 2017-06-12
Attending: ORTHOPAEDIC SURGERY
Payer: COMMERCIAL

## 2017-06-12 DIAGNOSIS — M25.571 ACUTE RIGHT ANKLE PAIN: Primary | ICD-10-CM

## 2017-06-12 PROCEDURE — 97110 THERAPEUTIC EXERCISES: CPT | Performed by: PHYSICAL THERAPIST

## 2017-06-12 NOTE — PROGRESS NOTES
Physical Therapy Daily Note     Date: 06/12/2017  Name: Anjelica Vizcaino  Clinic Number: [id number]  Diagnosis:   Encounter Diagnosis   Name Primary?    Acute right ankle pain Yes     Diagnosis: OPERATIVE PROCEDURE PERFORMED:  1. Right complex Achilles tendon repair.  2. Right placental graft application    Physician: Rex Lange*    Evaluation Date: 1/16/17  Date of Surgery: 12/8/16  Visit #: 19  Start Time:  11:30  Stop Time:  12:30  Total Treatment Time: 60    Precautions: WEIGHTBEARING STATUS: Nonweightbearing for 6 weeks, partial weightbearing at   6-7 weeks and full weightbearing by 7-8 weeks with fracture walking boot in   place with ambulation at 6-7 and 8 weeks' time.      Following 6 weeks to 8 weeks protection, the patient could be advanced to a   standard Achilles tendon repair protocol, but should avoid high impact type   loads approximately 4-6 months following surgery.    Subjective     Pt states the leg is doing well.     Pain: 0/10    Objective     Measurements taken: none  FOTO: 35% 2/6/17  Patient received individual therapy to increase strength, endurance and ROM with activities as follows:     Anjelica received therapeutic exercises to develop strength, endurance and ROM for 45 minutes including:   SL bridges 3x10  Clamshells 3x10  Shuttle 2-1 2c 3x10  6 in step ups 3x10  6 in step downs 3x10  SLS 3x10  Lunge walks x 2 laps  Shuttle calf raises 2-1 x30  6 in step calf raises 2-1 x30  Split squat calf raises 2-1 x 30  Power calf raises x 30  In and outs/crossovers x 5  Figure 8s x 3 min  Cone cuts with basketball dribble x10  Cone jumpers x 20   Alt shuttle jumps 1c 2x1min  12 in box jumps with drop down hop 2x10  Bounding x 2 laps  Calf bounding x 2 laps    Anjelica received the following manual therapy techniques: Joint mobilizations and Soft tissue Mobilization were applied to the: right ankle for 5 minutes.   STM to incisions  - PROM:  all  - posterior talus mobs    Pt received wound care from wound care specialist PT.     Written Home Exercises Provided: updated as per therex list  Pt demo good understanding of the education provided. Anjelica demonstrated good return demonstration of activities.     Education provided:  Anjelica verbalized good understanding of education provided.   No spiritual or educational barriers to learning identified.    Assessment     Excellent tolerance to light plyometrics. Needs continued strength at end range plantar flexion.     This is a 36 y.o. male referred to outpatient physical therapy and presents with a medical diagnosis of right ankle pain and demonstrates limitations as described in the problem list Pt prognosis is Good. Pt will continue to benefit from skilled outpatient physical therapy to address the deficits listed below in the problem list, provide pt/family education and to maximize pt's level of independence in the home and community environment.    Will the patient continue to benefit from skilled PT intervention? yes        Medical necessity is demonstrated by:   - Pain limits function of effected part for all activities  - Unable to participate in daily activities   - Requires skilled supervision to complete and progress HEP  - Fall risk - impaired balance   - Continued inability to participate in vocational pursuits    Short Term Goals (9  Weeks):  - Pt will increase ROM of right ankle = left.  - Pt will increase strength of bilateral hip abd = 5/5  - Decrease Pain to 1/10 with ADLs  - Pt to self correct posture independently with normalized gait pattern.   - Pt independent with HEP with progressions.      Long Term Goals (18 Weeks):  - Pt with normalized SLS mechanics x 2 min  - Pt with 94% Ybalance scores  - Decrease Pain to 0/10 with jogging, boxing  - Pt to return to boxing without pain.       Plan   Continue with established Plan of Care towards PT goals.      Therapist: ALESIA SMALLWOOD, PT

## 2017-07-10 ENCOUNTER — PATIENT MESSAGE (OUTPATIENT)
Dept: SPORTS MEDICINE | Facility: CLINIC | Age: 37
End: 2017-07-10

## 2020-03-30 ENCOUNTER — PATIENT MESSAGE (OUTPATIENT)
Dept: FAMILY MEDICINE | Facility: CLINIC | Age: 40
End: 2020-03-30

## 2020-03-31 ENCOUNTER — PATIENT MESSAGE (OUTPATIENT)
Dept: FAMILY MEDICINE | Facility: CLINIC | Age: 40
End: 2020-03-31

## 2020-04-04 ENCOUNTER — PATIENT MESSAGE (OUTPATIENT)
Dept: FAMILY MEDICINE | Facility: CLINIC | Age: 40
End: 2020-04-04

## 2020-04-06 ENCOUNTER — PATIENT MESSAGE (OUTPATIENT)
Dept: FAMILY MEDICINE | Facility: CLINIC | Age: 40
End: 2020-04-06

## 2020-04-07 ENCOUNTER — PATIENT MESSAGE (OUTPATIENT)
Dept: FAMILY MEDICINE | Facility: CLINIC | Age: 40
End: 2020-04-07

## 2020-04-08 ENCOUNTER — PATIENT MESSAGE (OUTPATIENT)
Dept: FAMILY MEDICINE | Facility: CLINIC | Age: 40
End: 2020-04-08

## 2020-09-22 ENCOUNTER — LAB VISIT (OUTPATIENT)
Dept: PRIMARY CARE CLINIC | Facility: CLINIC | Age: 40
End: 2020-09-22
Payer: COMMERCIAL

## 2020-09-22 DIAGNOSIS — Z03.818 ENCOUNTER FOR OBSERVATION FOR SUSPECTED EXPOSURE TO OTHER BIOLOGICAL AGENTS RULED OUT: ICD-10-CM

## 2020-09-22 PROCEDURE — U0003 INFECTIOUS AGENT DETECTION BY NUCLEIC ACID (DNA OR RNA); SEVERE ACUTE RESPIRATORY SYNDROME CORONAVIRUS 2 (SARS-COV-2) (CORONAVIRUS DISEASE [COVID-19]), AMPLIFIED PROBE TECHNIQUE, MAKING USE OF HIGH THROUGHPUT TECHNOLOGIES AS DESCRIBED BY CMS-2020-01-R: HCPCS

## 2020-09-25 LAB — SARS-COV-2 RNA RESP QL NAA+PROBE: NOT DETECTED

## 2021-04-16 ENCOUNTER — PATIENT MESSAGE (OUTPATIENT)
Dept: RESEARCH | Facility: HOSPITAL | Age: 41
End: 2021-04-16

## 2021-06-24 ENCOUNTER — IMMUNIZATION (OUTPATIENT)
Dept: OBSTETRICS AND GYNECOLOGY | Facility: CLINIC | Age: 41
End: 2021-06-24
Payer: COMMERCIAL

## 2021-06-24 DIAGNOSIS — Z23 NEED FOR VACCINATION: Primary | ICD-10-CM

## 2021-06-24 PROCEDURE — 91300 COVID-19, MRNA, LNP-S, PF, 30 MCG/0.3 ML DOSE VACCINE: CPT | Mod: PBBFAC | Performed by: FAMILY MEDICINE

## 2021-07-13 ENCOUNTER — IMMUNIZATION (OUTPATIENT)
Dept: OBSTETRICS AND GYNECOLOGY | Facility: CLINIC | Age: 41
End: 2021-07-13
Payer: COMMERCIAL

## 2021-07-13 DIAGNOSIS — Z23 NEED FOR VACCINATION: Primary | ICD-10-CM

## 2021-07-13 PROCEDURE — 0002A COVID-19, MRNA, LNP-S, PF, 30 MCG/0.3 ML DOSE VACCINE: CPT | Mod: PBBFAC | Performed by: FAMILY MEDICINE

## 2021-07-13 PROCEDURE — 91300 COVID-19, MRNA, LNP-S, PF, 30 MCG/0.3 ML DOSE VACCINE: CPT | Mod: PBBFAC | Performed by: FAMILY MEDICINE

## 2022-01-09 ENCOUNTER — LAB VISIT (OUTPATIENT)
Dept: PRIMARY CARE CLINIC | Facility: OTHER | Age: 42
End: 2022-01-09
Attending: INTERNAL MEDICINE
Payer: COMMERCIAL

## 2022-01-09 DIAGNOSIS — R05.9 COUGH: ICD-10-CM

## 2022-01-09 PROCEDURE — U0003 INFECTIOUS AGENT DETECTION BY NUCLEIC ACID (DNA OR RNA); SEVERE ACUTE RESPIRATORY SYNDROME CORONAVIRUS 2 (SARS-COV-2) (CORONAVIRUS DISEASE [COVID-19]), AMPLIFIED PROBE TECHNIQUE, MAKING USE OF HIGH THROUGHPUT TECHNOLOGIES AS DESCRIBED BY CMS-2020-01-R: HCPCS | Performed by: INTERNAL MEDICINE

## 2022-01-11 LAB
SARS-COV-2 RNA RESP QL NAA+PROBE: NOT DETECTED
SARS-COV-2- CYCLE NUMBER: NORMAL

## 2023-10-24 ENCOUNTER — TELEPHONE (OUTPATIENT)
Dept: INTERNAL MEDICINE | Facility: CLINIC | Age: 43
End: 2023-10-24
Payer: COMMERCIAL

## 2023-10-24 NOTE — TELEPHONE ENCOUNTER
----- Message from Olive Funez MA sent at 10/24/2023  1:19 PM CDT -----  Contact: [phone]    ----- Message -----  From: Macho Corbett  Sent: 10/24/2023  12:27 PM CDT  To: Nikita Figueroa Staff    Patient is returning a phone call.  Who left a message for the patient: Vera  Does patient know what this is regarding:  n/a   Would you like a call back, or a response through your [facility] portal?:   call back   Comments:      Pt missed a call and would like a call back

## 2023-10-24 NOTE — TELEPHONE ENCOUNTER
Left voice message at work   Appointment cancelled  Also sending my chart message   Will need to reschedule appointment

## 2023-10-30 ENCOUNTER — OFFICE VISIT (OUTPATIENT)
Dept: INTERNAL MEDICINE | Facility: CLINIC | Age: 43
End: 2023-10-30
Payer: COMMERCIAL

## 2023-10-30 ENCOUNTER — LAB VISIT (OUTPATIENT)
Dept: LAB | Facility: HOSPITAL | Age: 43
End: 2023-10-30
Payer: COMMERCIAL

## 2023-10-30 VITALS
BODY MASS INDEX: 46.45 KG/M2 | DIASTOLIC BLOOD PRESSURE: 86 MMHG | HEIGHT: 66 IN | SYSTOLIC BLOOD PRESSURE: 130 MMHG | OXYGEN SATURATION: 96 % | HEART RATE: 82 BPM | WEIGHT: 289 LBS

## 2023-10-30 DIAGNOSIS — E66.01 CLASS 3 OBESITY: ICD-10-CM

## 2023-10-30 DIAGNOSIS — Z76.89 ENCOUNTER TO ESTABLISH CARE WITH NEW DOCTOR: Primary | ICD-10-CM

## 2023-10-30 PROBLEM — E66.813 CLASS 3 OBESITY: Status: ACTIVE | Noted: 2023-10-30

## 2023-10-30 LAB
ALBUMIN SERPL BCP-MCNC: 3.9 G/DL (ref 3.5–5.2)
ALP SERPL-CCNC: 103 U/L (ref 55–135)
ALT SERPL W/O P-5'-P-CCNC: 26 U/L (ref 10–44)
ANION GAP SERPL CALC-SCNC: 10 MMOL/L (ref 8–16)
AST SERPL-CCNC: 28 U/L (ref 10–40)
BASOPHILS # BLD AUTO: 0.04 K/UL (ref 0–0.2)
BASOPHILS NFR BLD: 0.4 % (ref 0–1.9)
BILIRUB SERPL-MCNC: 0.8 MG/DL (ref 0.1–1)
BUN SERPL-MCNC: 12 MG/DL (ref 6–20)
CALCIUM SERPL-MCNC: 9.3 MG/DL (ref 8.7–10.5)
CHLORIDE SERPL-SCNC: 105 MMOL/L (ref 95–110)
CHOLEST SERPL-MCNC: 204 MG/DL (ref 120–199)
CHOLEST/HDLC SERPL: 6.2 {RATIO} (ref 2–5)
CO2 SERPL-SCNC: 24 MMOL/L (ref 23–29)
CREAT SERPL-MCNC: 1.2 MG/DL (ref 0.5–1.4)
DIFFERENTIAL METHOD: ABNORMAL
EOSINOPHIL # BLD AUTO: 0.5 K/UL (ref 0–0.5)
EOSINOPHIL NFR BLD: 4.8 % (ref 0–8)
ERYTHROCYTE [DISTWIDTH] IN BLOOD BY AUTOMATED COUNT: 15.4 % (ref 11.5–14.5)
EST. GFR  (NO RACE VARIABLE): >60 ML/MIN/1.73 M^2
ESTIMATED AVG GLUCOSE: 120 MG/DL (ref 68–131)
GLUCOSE SERPL-MCNC: 93 MG/DL (ref 70–110)
HBA1C MFR BLD: 5.8 % (ref 4–5.6)
HCT VFR BLD AUTO: 42.3 % (ref 40–54)
HDLC SERPL-MCNC: 33 MG/DL (ref 40–75)
HDLC SERPL: 16.2 % (ref 20–50)
HGB BLD-MCNC: 14 G/DL (ref 14–18)
IMM GRANULOCYTES # BLD AUTO: 0.03 K/UL (ref 0–0.04)
IMM GRANULOCYTES NFR BLD AUTO: 0.3 % (ref 0–0.5)
LDLC SERPL CALC-MCNC: 150.8 MG/DL (ref 63–159)
LYMPHOCYTES # BLD AUTO: 3.7 K/UL (ref 1–4.8)
LYMPHOCYTES NFR BLD: 37 % (ref 18–48)
MCH RBC QN AUTO: 29.2 PG (ref 27–31)
MCHC RBC AUTO-ENTMCNC: 33.1 G/DL (ref 32–36)
MCV RBC AUTO: 88 FL (ref 82–98)
MONOCYTES # BLD AUTO: 0.5 K/UL (ref 0.3–1)
MONOCYTES NFR BLD: 5.1 % (ref 4–15)
NEUTROPHILS # BLD AUTO: 5.2 K/UL (ref 1.8–7.7)
NEUTROPHILS NFR BLD: 52.4 % (ref 38–73)
NONHDLC SERPL-MCNC: 171 MG/DL
NRBC BLD-RTO: 0 /100 WBC
PLATELET # BLD AUTO: 317 K/UL (ref 150–450)
PMV BLD AUTO: 9.4 FL (ref 9.2–12.9)
POTASSIUM SERPL-SCNC: 3.8 MMOL/L (ref 3.5–5.1)
PROT SERPL-MCNC: 7.4 G/DL (ref 6–8.4)
RBC # BLD AUTO: 4.8 M/UL (ref 4.6–6.2)
SODIUM SERPL-SCNC: 139 MMOL/L (ref 136–145)
TRIGL SERPL-MCNC: 101 MG/DL (ref 30–150)
TSH SERPL DL<=0.005 MIU/L-ACNC: 1.68 UIU/ML (ref 0.4–4)
WBC # BLD AUTO: 9.95 K/UL (ref 3.9–12.7)

## 2023-10-30 PROCEDURE — 3079F PR MOST RECENT DIASTOLIC BLOOD PRESSURE 80-89 MM HG: ICD-10-PCS | Mod: CPTII,GC,S$GLB,

## 2023-10-30 PROCEDURE — 3008F PR BODY MASS INDEX (BMI) DOCUMENTED: ICD-10-PCS | Mod: CPTII,GC,S$GLB,

## 2023-10-30 PROCEDURE — 99999 PR PBB SHADOW E&M-EST. PATIENT-LVL IV: ICD-10-PCS | Mod: PBBFAC,,,

## 2023-10-30 PROCEDURE — 3075F SYST BP GE 130 - 139MM HG: CPT | Mod: CPTII,GC,S$GLB,

## 2023-10-30 PROCEDURE — 99203 PR OFFICE/OUTPT VISIT, NEW, LEVL III, 30-44 MIN: ICD-10-PCS | Mod: GC,S$GLB,,

## 2023-10-30 PROCEDURE — 80061 LIPID PANEL: CPT

## 2023-10-30 PROCEDURE — 99203 OFFICE O/P NEW LOW 30 MIN: CPT | Mod: GC,S$GLB,,

## 2023-10-30 PROCEDURE — 3075F PR MOST RECENT SYSTOLIC BLOOD PRESS GE 130-139MM HG: ICD-10-PCS | Mod: CPTII,GC,S$GLB,

## 2023-10-30 PROCEDURE — 1159F MED LIST DOCD IN RCRD: CPT | Mod: CPTII,GC,S$GLB,

## 2023-10-30 PROCEDURE — 3008F BODY MASS INDEX DOCD: CPT | Mod: CPTII,GC,S$GLB,

## 2023-10-30 PROCEDURE — 83036 HEMOGLOBIN GLYCOSYLATED A1C: CPT

## 2023-10-30 PROCEDURE — 84443 ASSAY THYROID STIM HORMONE: CPT

## 2023-10-30 PROCEDURE — 99999 PR PBB SHADOW E&M-EST. PATIENT-LVL IV: CPT | Mod: PBBFAC,,,

## 2023-10-30 PROCEDURE — 80053 COMPREHEN METABOLIC PANEL: CPT

## 2023-10-30 PROCEDURE — 3079F DIAST BP 80-89 MM HG: CPT | Mod: CPTII,GC,S$GLB,

## 2023-10-30 PROCEDURE — 1160F RVW MEDS BY RX/DR IN RCRD: CPT | Mod: CPTII,GC,S$GLB,

## 2023-10-30 PROCEDURE — 85025 COMPLETE CBC W/AUTO DIFF WBC: CPT

## 2023-10-30 PROCEDURE — 3044F HG A1C LEVEL LT 7.0%: CPT | Mod: CPTII,GC,S$GLB,

## 2023-10-30 PROCEDURE — 3044F PR MOST RECENT HEMOGLOBIN A1C LEVEL <7.0%: ICD-10-PCS | Mod: CPTII,GC,S$GLB,

## 2023-10-30 PROCEDURE — 36415 COLL VENOUS BLD VENIPUNCTURE: CPT

## 2023-10-30 PROCEDURE — 1159F PR MEDICATION LIST DOCUMENTED IN MEDICAL RECORD: ICD-10-PCS | Mod: CPTII,GC,S$GLB,

## 2023-10-30 PROCEDURE — 1160F PR REVIEW ALL MEDS BY PRESCRIBER/CLIN PHARMACIST DOCUMENTED: ICD-10-PCS | Mod: CPTII,GC,S$GLB,

## 2023-10-30 NOTE — PROGRESS NOTES
Subjective     Chief Complaint: Establish care    History of Present Illness:  Mr. Anjelica Vizcaino is a 42 y.o. male with no significant PMHx who presents today to establish care. Main concern today is weight gain. Reports that he has been gaining weight over the last few years. Currently works as a  and reports a sedentary lifestyle. Reports that he struggles to find the time to exercise. Denies any chest pain, shortness of breath, abd pain, nausea, vomiting, diarrhea, fever, chills, weakness or dizziness.      Social Hx:   Diet: Reports that his diet is not the best and eats high calories foods  Exercise: does not exercise due to lack of time  Tobacco use: none  Alcohol use: occasional   Drugs use: none  Occupation:      Health Maintenance:  Vaccines: declines the flu vaccine and has plans to receive the COVID vaccine at work    Review of Systems   Constitutional:  Negative for chills and fever.   HENT:  Negative for congestion.    Respiratory:  Negative for cough.    Cardiovascular:  Negative for chest pain, palpitations and leg swelling.   Gastrointestinal:  Negative for abdominal pain, diarrhea, nausea and vomiting.   Neurological:  Negative for dizziness and weakness.   All other systems reviewed and are negative.      PAST HISTORY:     Past Medical History:   Diagnosis Date    COVID-19 virus infection 03/2020       Past Surgical History:   Procedure Laterality Date    thumb surgery Right age 19       Family History   Problem Relation Age of Onset    Kidney failure Father     Alcohol abuse Father     Diabetes Maternal Grandmother        Social History     Socioeconomic History    Marital status:     Number of children: 2   Occupational History    Occupation: building mgr   Tobacco Use    Smoking status: Never   Substance and Sexual Activity    Alcohol use: Yes     Alcohol/week: 1.0 standard drink of alcohol     Types: 1 Standard drinks or equivalent per week      "Comment: occasional    Drug use: No   Social History Narrative    The patient does not exercise regularly ().      Rates diet as fair.      He is not satisfied with weight.           MEDICATIONS & ALLERGIES:     Current Outpatient Medications on File Prior to Visit   Medication Sig    ibuprofen (ADVIL,MOTRIN) 200 MG tablet Take 200 mg by mouth every 6 (six) hours as needed for Pain.    [DISCONTINUED] aspirin 325 MG tablet Take 1 tablet (325 mg total) by mouth once daily.    [DISCONTINUED] oxycodone-acetaminophen (PERCOCET)  mg per tablet Take 1 tablet by mouth every 4 (four) hours as needed for Pain.    [DISCONTINUED] promethazine (PHENERGAN) 25 MG tablet Take 1 tablet (25 mg total) by mouth every 4 (four) hours.     No current facility-administered medications on file prior to visit.       Review of patient's allergies indicates:  No Known Allergies    OBJECTIVE:     Vital Signs:  Vitals:    10/30/23 0917   BP: 130/86   BP Location: Left arm   Patient Position: Sitting   BP Method: Large (Manual)   Pulse: 82   SpO2: 96%   Weight: 131.1 kg (289 lb 0.4 oz)   Height: 5' 6" (1.676 m)       Body mass index is 46.65 kg/m².     Physical Exam  Vitals and nursing note reviewed.   Constitutional:       Appearance: He is obese.   HENT:      Head: Atraumatic.      Right Ear: External ear normal.      Left Ear: External ear normal.      Mouth/Throat:      Mouth: Mucous membranes are moist.      Pharynx: Oropharynx is clear.   Eyes:      Extraocular Movements: Extraocular movements intact.   Cardiovascular:      Rate and Rhythm: Normal rate and regular rhythm.      Pulses: Normal pulses.      Heart sounds: Normal heart sounds.   Pulmonary:      Effort: Pulmonary effort is normal.      Breath sounds: Normal breath sounds.   Abdominal:      Palpations: Abdomen is soft.   Musculoskeletal:         General: Normal range of motion.   Skin:     General: Skin is warm and dry.   Neurological:      General: No focal deficit present. "      Mental Status: He is alert and oriented to person, place, and time.   Psychiatric:         Mood and Affect: Mood normal.         Behavior: Behavior normal.         Laboratory  No results found for this or any previous visit (from the past 24 hour(s)).    Diagnostic Results:      Health Maintenance Due   Topic Date Due    Hemoglobin A1c (Diabetic Prevention Screening)  Never done    Lipid Panel  03/10/2021         ASSESSMENT & PLAN:   43 y/o M with no significant PMHx who presents to establish care. Main concern is to lose weight. Discussed options with the patient and is interested in intermittent fasting. A:so interested in medications for weight loss. Instructed patient to eat within an 8 hour window and fast for 16 hours. Also instructed patient to keep a log of the meals he eats and log of his weight. Given his age, patient may benefit from an evaluation from bariatric medicine to see if he is a candidate for any other therapies. Will send a referral.     1. Encounter to establish care with new doctor    2. Class 3 obesity  -     CBC W/ AUTO DIFFERENTIAL; Future; Expected date: 10/30/2023  -     COMPREHENSIVE METABOLIC PANEL; Future; Expected date: 10/30/2023  -     LIPID PANEL; Future; Expected date: 10/30/2023  -     HEMOGLOBIN A1C; Future; Expected date: 10/30/2023  -     TSH; Future; Expected date: 10/30/2023  -     Ambulatory referral/consult to Bariatric Medicine; Future; Expected date: 11/06/2023        See above for further detail.    RTC in 1 month      Discussed with Dr Feliciano -- staff attestation to follow      Abiodun Gong DO  Internal Medicine PGY-1  [facility]     Y./

## 2023-10-30 NOTE — PATIENT INSTRUCTIONS
We will start with intermittent fasting. Please fast for 16 hours and eat within an 8 hour window. Please keep track of your weight and meals daily. Please follow up with me in 1 month and please follow up with bariatric medicine.

## 2023-10-31 ENCOUNTER — TELEPHONE (OUTPATIENT)
Dept: SURGERY | Facility: CLINIC | Age: 43
End: 2023-10-31
Payer: COMMERCIAL

## 2023-11-02 ENCOUNTER — TELEPHONE (OUTPATIENT)
Dept: BARIATRICS | Facility: CLINIC | Age: 43
End: 2023-11-02
Payer: COMMERCIAL

## 2023-11-06 ENCOUNTER — TELEPHONE (OUTPATIENT)
Dept: BARIATRICS | Facility: CLINIC | Age: 43
End: 2023-11-06
Payer: COMMERCIAL

## 2023-11-06 NOTE — TELEPHONE ENCOUNTER
----- Message from Mp Rutherford sent at 11/6/2023  8:19 AM CST -----  Regarding: Appt  Contact: [phone]  Patient is calling to schedule appointment he completed his financial visit on 11/1 over the phone. Please contact patient to further discuss.

## 2023-11-09 NOTE — PROGRESS NOTES
"Subjective     Patient ID: Anjelica Vizcaino is a 43 y.o. male.    Chief Complaint: Consult    CC:weight    New pt to me, referred by Abiodun Gong,   [id number] [place] [address]  Patient Active Problem List:     Rupture of right Achilles tendon     Chronic pain of right ankle     Class 3 obesity     Lab Results       Component                Value               Date                       HGBA1C                   5.8 (H)             10/30/2023            Lab Results       Component                Value               Date                       LDLCALC                  150.8               10/30/2023                 CREATININE               1.2                 10/30/2023                Current attempts at weight loss: Has been trying to eat better. No exercise.     Previous diet attempts: Denies    History of medication for loss:   checked today. Denies.      Heaviest weight: 290#    Lightest weight: 160#    Goal weight: 190#      Last eye exam:  10 years. No glaucoma per pt.    Provider:    Typical eating patterns:  Works as . Lives with wife and 2 kids. Wife does cooking.   Breakfast: eggs, grits, turkey turpin and sausage. Weekends: same. Donuts.     lunch: melon, chicken- baked or fried, rice, roll. Weekends: may skip. Popeyes, mcDs.     dinner: baked fish. Stir capone.   if out- Popeyes, mcDs    snacks: chips,     Beverages: water, ginger ale, punch. ETOH- 3 times a month 3-4 drinks- whiskey     Willingness to change:  10/10    Cardiac studies:     BMR: 1836    PBF:  48%      Review of Systems       Objective   /84 (BP Location: Left arm, Patient Position: Sitting, BP Method: Large (Manual))   Pulse 81   Ht 5' 6" (1.676 m)   Wt 130.5 kg (287 lb 12.8 oz)   SpO2 98%   BMI 46.45 kg/m²     Physical Exam  Vitals reviewed.   Constitutional:       General: He is not in acute distress.     Appearance: He is well-developed.      Comments: With severe obesity     HENT:      " Head: Normocephalic and atraumatic.   Eyes:      General: No scleral icterus.     Pupils: Pupils are equal, round, and reactive to light.   Neck:      Thyroid: No thyromegaly.   Cardiovascular:      Rate and Rhythm: Normal rate and regular rhythm.      Heart sounds: Normal heart sounds. No murmur heard.     No friction rub. No gallop.   Pulmonary:      Effort: Pulmonary effort is normal. No respiratory distress.      Breath sounds: Normal breath sounds. No wheezing.   Abdominal:      General: Bowel sounds are normal. There is no distension.      Palpations: Abdomen is soft.      Tenderness: There is no abdominal tenderness.   Musculoskeletal:         General: Normal range of motion.      Cervical back: Normal range of motion and neck supple.   Skin:     General: Skin is warm and dry.   Neurological:      Mental Status: He is alert and oriented to person, place, and time.   Psychiatric:         Behavior: Behavior normal.         Judgment: Judgment normal.            Assessment and Plan     1. Class 3 severe obesity due to excess calories without serious comorbidity with body mass index (BMI) of 45.0 to 49.9 in adult  -     diethylpropion (TENUATE) 75 mg SR tablet; Take 1 tablet (75 mg total) by mouth once daily.  Dispense: 30 tablet; Refill: 1        Anjelica was seen today for consult.    Diagnoses and all orders for this visit:    Class 3 severe obesity due to excess calories without serious comorbidity with body mass index (BMI) of 45.0 to 49.9 in adult  -     diethylpropion (TENUATE) 75 mg SR tablet; Take 1 tablet (75 mg total) by mouth once daily.        Patient warned of common side effects of diethylpropion including anxiety, insomnia, palpitations and increased blood pressure. It was also explained that it is for short-term usage along with diet and exercise, and that stopping the medication without making lifestyle changes will result in regain of weight. Patient states understanding.    Weight loss  medications are controlled substances.  They require routine follow up. Prescription or pills that are lost or destroyed will not be replaced.     Increase low impact activity as tolerated.  Avoid high impact activity, very heavy lifting or other exercise regimens that may cause discomfort.       Add some type of resistance training 2-3 days a week. These can be body weight exercises, light weight or elastic bands. [employer] and [employer] are great sources for free work out plans and videos.     1200 angella pb meal planner, meal ideas and exercise handouts given           No follow-ups on file.

## 2023-11-10 ENCOUNTER — OFFICE VISIT (OUTPATIENT)
Dept: BARIATRICS | Facility: CLINIC | Age: 43
End: 2023-11-10
Payer: COMMERCIAL

## 2023-11-10 VITALS
HEIGHT: 66 IN | OXYGEN SATURATION: 98 % | WEIGHT: 287.81 LBS | BODY MASS INDEX: 46.25 KG/M2 | SYSTOLIC BLOOD PRESSURE: 130 MMHG | DIASTOLIC BLOOD PRESSURE: 84 MMHG | HEART RATE: 81 BPM

## 2023-11-10 DIAGNOSIS — E66.01 CLASS 3 SEVERE OBESITY DUE TO EXCESS CALORIES WITHOUT SERIOUS COMORBIDITY WITH BODY MASS INDEX (BMI) OF 45.0 TO 49.9 IN ADULT: Primary | ICD-10-CM

## 2023-11-10 PROCEDURE — 99999 PR PBB SHADOW E&M-EST. PATIENT-LVL IV: ICD-10-PCS | Mod: PBBFAC,,, | Performed by: INTERNAL MEDICINE

## 2023-11-10 PROCEDURE — 3075F PR MOST RECENT SYSTOLIC BLOOD PRESS GE 130-139MM HG: ICD-10-PCS | Mod: CPTII,S$GLB,, | Performed by: INTERNAL MEDICINE

## 2023-11-10 PROCEDURE — 3044F HG A1C LEVEL LT 7.0%: CPT | Mod: CPTII,S$GLB,, | Performed by: INTERNAL MEDICINE

## 2023-11-10 PROCEDURE — 99215 OFFICE O/P EST HI 40 MIN: CPT | Mod: S$GLB,,, | Performed by: INTERNAL MEDICINE

## 2023-11-10 PROCEDURE — 3044F PR MOST RECENT HEMOGLOBIN A1C LEVEL <7.0%: ICD-10-PCS | Mod: CPTII,S$GLB,, | Performed by: INTERNAL MEDICINE

## 2023-11-10 PROCEDURE — 3008F BODY MASS INDEX DOCD: CPT | Mod: CPTII,S$GLB,, | Performed by: INTERNAL MEDICINE

## 2023-11-10 PROCEDURE — 1159F MED LIST DOCD IN RCRD: CPT | Mod: CPTII,S$GLB,, | Performed by: INTERNAL MEDICINE

## 2023-11-10 PROCEDURE — 3075F SYST BP GE 130 - 139MM HG: CPT | Mod: CPTII,S$GLB,, | Performed by: INTERNAL MEDICINE

## 2023-11-10 PROCEDURE — 1159F PR MEDICATION LIST DOCUMENTED IN MEDICAL RECORD: ICD-10-PCS | Mod: CPTII,S$GLB,, | Performed by: INTERNAL MEDICINE

## 2023-11-10 PROCEDURE — 1160F PR REVIEW ALL MEDS BY PRESCRIBER/CLIN PHARMACIST DOCUMENTED: ICD-10-PCS | Mod: CPTII,S$GLB,, | Performed by: INTERNAL MEDICINE

## 2023-11-10 PROCEDURE — 3008F PR BODY MASS INDEX (BMI) DOCUMENTED: ICD-10-PCS | Mod: CPTII,S$GLB,, | Performed by: INTERNAL MEDICINE

## 2023-11-10 PROCEDURE — 3079F DIAST BP 80-89 MM HG: CPT | Mod: CPTII,S$GLB,, | Performed by: INTERNAL MEDICINE

## 2023-11-10 PROCEDURE — 1160F RVW MEDS BY RX/DR IN RCRD: CPT | Mod: CPTII,S$GLB,, | Performed by: INTERNAL MEDICINE

## 2023-11-10 PROCEDURE — 99215 PR OFFICE/OUTPT VISIT, EST, LEVL V, 40-54 MIN: ICD-10-PCS | Mod: S$GLB,,, | Performed by: INTERNAL MEDICINE

## 2023-11-10 PROCEDURE — 99999 PR PBB SHADOW E&M-EST. PATIENT-LVL IV: CPT | Mod: PBBFAC,,, | Performed by: INTERNAL MEDICINE

## 2023-11-10 PROCEDURE — 3079F PR MOST RECENT DIASTOLIC BLOOD PRESSURE 80-89 MM HG: ICD-10-PCS | Mod: CPTII,S$GLB,, | Performed by: INTERNAL MEDICINE

## 2023-11-10 RX ORDER — DIETHYLPROPION HYDROCHLORIDE 75 MG/1
75 TABLET, EXTENDED RELEASE ORAL DAILY
Qty: 30 TABLET | Refills: 1 | Status: SHIPPED | OUTPATIENT
Start: 2023-11-10 | End: 2023-12-04

## 2023-11-10 NOTE — PATIENT INSTRUCTIONS
"PLEASE ARRIVE 15-20 min EARLY FOR YOUR APPOINTMENTS. This allows us to perform all of the services for your appointment in a timely fashion. Arriving late for your appointment not only decreases the time available for the doctor to see you, it also leads to delays for every other patient scheduled after you. Patients arriving after their appointment time may be asked to wait until all patients that arrived on time are seen, and/or there is another available slot for you to be worked in. When this is not possible, you may be asked to reschedule.   Thank you for your consideration in this matter.       Patient warned of common side effects of diethylpropion including anxiety, insomnia, palpitations and increased blood pressure. It was also explained that it is for short-term usage along with diet and exercise, and that stopping the medication without making lifestyle changes will result in regain of weight. Patient states understanding.    Weight loss medications are controlled substances.  They require routine follow up. Prescription or pills that are lost or destroyed will not be replaced.     Increase low impact activity as tolerated.  Avoid high impact activity, very heavy lifting or other exercise regimens that may cause discomfort.       Add some type of resistance training 2-3 days a week. These can be body weight exercises, light weight or elastic bands. [employer] and [employer] are great sources for free work out plans and videos.             1200 calorie  Meal Plan  STARCHES 80 CALORIES PER SERVING 15g CARB, 3g PROTEIN, 1g FAT  Servings per day   bread   tortilla   crackers   cooked cereals   dry cereals   pasta   rice   corn   popcorn   potato (small)   potato, mashed   sweet potato   squash, winter   cooked beans, peas, lentils (add 1 meat exchange)   1 slice   1 (6")   4-6 (3/4 oz)   1/2 cup   3/4 cup   1/2 cup   1/3 cup  1/2 cup   1 small light bag  1 (3 oz)   1/2 cup   1/3 cup   1 cup   1/2 cup Most " starches are a good source of B vitamins   Choose whole grain foods such as 100% whole wheat bread and flour, brown rice, tortillas, etc. for nutrients and fiber.   Combine beans (starch & meat) with grains (starch) for their complimentary proteins and fiber   Combine grains (starch) with milk (milk) or cheese (meat) to compliment proteins     3   FRUIT 60 CALORIES PER SERVING 15g CARB    fresh fruit   banana  melon (cubes)   berries  canned fruit   dried fruit    1 small   1/2  ½ cup   ¾ cup  ½ cup   ¼ cup    Choose whole fruits for fiber   No fruit juices   2   DAIRY  CALORIES PER SERVING 12g CARB, 8g PROTEIN, 0-8g FAT    milk   yogurt  Protein soy or almond milk 1 cup   1 cup   1 cup Use unsweetened almond or soy milk with added protein  Avoid chocolate or flavored milk  Avoid yogurt with more than 8 gms of sugar. Gms of protein should be higher than grams of sugar               2   MEAT AND SUBSTITUES  CALORIES PER SERVING 7g Protein, 0-13g FAT    Meat,Seafood and fish   cheese   cottage cheese   egg   peanut butter   tofu or tempeh  cooked beans, peas, lentils (add 1 starch)  Quinoa (add 1 starch)   Nuts and seeds (½ serving protein + 1 fat)  Nutritional yeast  Morning Star grillers 1 oz     1 oz  1/4 cup     1   1.5 Tbsp   4 oz (1/2 cup)   1/2 cup              ¼ cup            2 tablespoons    1 joshua or ½ cup      Choose fish, seafood and lower fat cheeses  Limit frying or adding fat.      9   FATS 45 CALORIES PER SERVING     oil   mayonnaise   cream cheese   salad dressing   peanuts   avocado   butter or margarine    1 tsp   1 tsp   1 Tbsp   1 Tbsp   10   1/8   1 tsp Eat less fat.   Eat less saturated fat such as animal fat found in fatter meat, cheese, and butter. Also eat less hydrogenated fat.      2-3   VEGETABLES 25 CALORIES PER SERVING 5 g CARB, 2g PROTEIN    raw vegetables   cooked vegetables   tomato or vegetable juice 1 cup   1/2 cup     1/2 cup Choose dark green leafy and deep yellow  vegetables such as spinach, zuchinni, squash, mushrooms, cauliflower ,broccoli, carrots, and peppers.   Unlimited          1200 CALORIE MEAL PLAN  Eat 3 small meals per day with 1-2 small snacks to keep you full throughout the day  Aim for at least 15 gms of protein at breakfast, at least 25 grams at lunch and at least 25 grams of protein at dinner.  Snacks should contain at least 5 grams of protein  Limit starches to 1 serving per meal or snack.  Keep your carbs whole grain or whole wheat  Limit your intake of refined sugar including sugary beverages ie sweet tea, lemonade, fruit punch             Fruit Protein Dairy Starch Fats Calories   Breakfast 1 2 1 1  340   Lunch  3  1 1 290   Dinner 1 4  1 1 405   Snack   1 1  170   Total 2 9 2 4 2 1205     Sample breakfasts:  2 scrambled eggs (use spray), 1 cup Protein Silk soy milk, 1 slice whole wheat toast, 1 small orange  Omelet made with 1 egg, 1-ounce low fat cheese and non-starchy veggies, 1 low fat plain yogurt with ½ banana  Plain yogurt with ¾ cup unsweetened cold cereal and ½ cup fresh fruit salad, 2 hardboiled eggs  Sample lunches:  ½ whole wheat bagel topped with 3 ounces of low fat cheese melted in oven with a wild green salad with 1 TBSP dressing  ¾ cup cooked beans with 6 whole grain crackers, 10 roasted peanuts  ½ medium baked potato with 3 ounces of low fat cheddar cheese and 1 TBSP  sour cream  1 small wheat tortilla brushed with 1 tsp olive oil then brushed with pizza sauce and 4 ounces low fat cheese, sliced mushrooms and green peppers broiled until cheese is melted.  ½ cup chopped melon    Sample Dinners:  4 ounces of tuna pan seared in 1 tsp olive oil, ½ baked small sweet potato and 2 small plums  ½ cup chick peas, 1 cup cauliflower sauteed with 1 tbsp chopped onion, 1 tsp oil, and 2 tsp silva powder. Add low sodium vegetable stock to desired consistency. Serve with ½ cup brown rice  Red beans seasoned with onions and bell peppers served over  cauliflower rice  1 cup cooked green or brown lentils served with ½ cup cooked quinoa and chopped tomatoes and cucumbers and 1 tbsp feta cheese  Sample Snacks:  1 cup of Protein Silk Soy milk with 3 squares xin crackers  3/4 ounce Triscuits with 1 ounce cubed cheese  Chobani Triple Zero yogurt with ¾ cup unsweetened cereal  ½ cup edamame (shelled)    Meal Ideas for Regular Bariatric Diet  *Recipes and products available at [email or web address].com      Breakfast: (15-20g protein)    - Egg white omelet: 2 egg whites or ½ cup Egg Beaters. (Optional proteins: cheese, shrimp, black beans, chicken, sliced turkey) (Optional veggies: tomatoes, salsa, spinach, mushrooms, onions, green peppers, or small slice avocado)     - Egg and sausage: 1 egg or ¼ cup Egg Beaters (any variety), with 1 joshua or 2 links of Turkey sausage or Veggie breakfast sausage ([employer] or [employer])    - Crust-less breakfast quiche: To make a glass pie dish, mix 4oz part skim Ricotta, 1 cup skim milk, and 2 eggs as your base. Add protein: shredded cheese, sliced lean ham or turkey, turkey turpin/sausage. Add veggies: tomato, onion, green onion, mushroom, green pepper, spinach, etc.    - Yogurt parfait: Mix 1 - 6oz container Dannon Light N Fit vanilla yogurt, with ¼ cup Kashi Go Lean cereal    - Cottage cheese and fruit: ½ cup part-skim cottage cheese or ricotta cheese topped with fresh fruit or sugar free preserves     - Guera Valenzuela's Vanilla Egg custard* (add 2 Tbsp instant coffee granules to make Cappuccino Custard*)    - Hi-Protein café latte (skim milk, decaf coffee, 1 scoop protein powder). Optional to add Sugar free syrup or extract flavoring.    Lunch: (20-30g protein)    - ½ cup Black bean soup (Homemade or Progresso), with ¼ cup shredded low-fat cheese. Top with chopped tomato or fresh salsa.     - Lean deli turkey breast and low-fat sliced cheese, mustard or light cole to moisten, rolled up together, or wrapped in a Werner lettuce  leaf    - Chicken salad made from dinner leftovers, moisten with low-fat salad dressing or light cole. Also try leftover salmon, shrimp, tuna or boiled eggs. Serve ½ cup over dark green salad    - Fat-free canned refried beans, topped with ¼ cup shredded low-fat cheese. Top with chopped tomato or fresh salsa.     - Greek salad: Top mixed greens with 1-2oz grilled chicken, tomatoes, red onions, 2-3 kalamata olives, and sprinkle lightly with feta cheese. Spritz with Balsamic vinegar to taste.     - Crust-less lunch quiche: To make a glass pie dish, mix 4oz part skim Ricotta, 1 cup skim milk, and 2 eggs as your base. Add protein: shredded cheese, sliced lean ham or turkey, shrimp, chicken. Add veggies: tomato, onion, green onion, mushroom, green pepper, spinach, artichoke, broccoli, etc.    - Pizza bake: tomato sauce, low-fat shredded mozzarella and turkey pepperoni or [place] turpin. Add any veggies.    - Cucumber crab bites: Spread ¼ cup crab dip (lump crabmeat + light cream cheese and green onions) over sliced cucumber.     - Chicken with light spinach and artichoke dip*: Puree in : 6oz cooked and drained spinach, 2 cloves garlic, 1 can cannelloni beans, ½ cup chopped green onions, 1 can drained artichoke hearts (not marinated in oil), lemon juice and basil. Mix in 2oz chopped up chicken.    Supper: (20-30g protein)    - Serve grilled fish over dark green salad tossed with low-fat dressing, served with grilled asparagus duran     - Rotisserie chicken salad: served with sliced strawberries, walnuts, fat-free feta cheese crumbles and 1 tbsp Ortizs Own Light Raspberry [place] Vinaigrette    - Shrimp cocktail: Dip cold boiled shrimp in homemade low-sugar cocktail sauce (1/2 cup Renee One Carb ketchup, 2 tbsp horseradish, 1/4 tsp hot sauce, 1 tsp Worcestershire sauce, 1 tbsp freshly-squeezed lemon juice). Serve with dark green salad, walnuts, and crumbled blue cheese drizzled with olive oil and Balsamic  vinegar    - Tuna Melt: Spread tuna salad onto 2 thick slices of tomato. Top with low-fat cheese and broil until cheese is melted. May also be made with chicken salad of shrimp salad. [place] with different types of cheeses.    - Homemade low-fat Chili using extra lean ground beef or ground turkey. Top with shredded cheese and salsa as desired. May add dollop fat-free sour cream if desired    - Dinner Omelet with shrimp or chicken and onion, green peppers and chives.    - No noodle lasagna: Use sliced zucchini or eggplant in place of noodles.  Layer with part skim ricotta cheese and low sugar meat sauce (use very lean ground beef or ground turkey).    - Mexican chicken bake: Bake chunks of chicken breast or thigh with taco seasoning, Pace brand enchilada sauce, green onions and low-fat cheese. Serve with ¼ cup black beans or fat free refried beans topped with chopped tomatoes or salsa.    - Salima frozen meatballs, simmered in Classico Marinara sauce. Different flavors of salsa or spaghetti sauce create different dishes! Sprinkle with parmesan cheese. Serve with grilled or steamed veggies, or a dark green salad.    - Simmer boneless skinless chicken thigh chunks in Classico Marinara sauce or roasted salsa until tender with chopped onion, bell pepper, garlic, mushrooms, spinach, etc.     - Hamburger, without the bun, dressed the way you like. Served with grilled or steamed veggies.    - Eggplant parmesan: Bake slices of eggplant at 350 degrees for 15 minutes. Layer tomato sauce, sliced eggplant and low-fat mozzarella cheese in a baking dish and cover with foil. Bake 30-40 more minutes or until bubbly. Uncover and bake at 400 degrees for about 15 more minutes, or until top is slightly crisp.    - Fish tacos: grilled/baked white fish, wrapped in Werner lettuce leaf, topped with salsa, shredded low-fat cheese, and light coleslaw.    Snacks: (100-200 calories; >5g protein)    - 1 low-fat cheese stick with 8 cherry  tomatoes or 1 serving fresh fruit  - 4 thin slices fat-free turkey breast and 1 slice low-fat cheese  - 4 thin slices fat-free honey ham with wedge of melon  - 1/4 cup unsalted nuts with ½ cup fruit  - 6-oz container Dannon Light n Fit vanilla yogurt, topped with 1oz unsalted nuts         - apple, celery or baby carrots spread with 2 Tbsp natural peanut butter or almond butter   - apple slices with 1 oz slice low-fat cheese  - celery, cucumber, bell pepper or baby carrots dipped in ¼ cup hummus bean spread or light spinach and artichoke dip (*recipe in lunch section)  - 100 calorie bag microwave light popcorn with 3 tbsp grated parmesan cheese  - Devante Links Beef Steak - 14g protein! (similar to beef jerky)  - 2 wedges [place] Cow - Light Herb & Garlic Cheese with sliced cucumber or green bell pepper  - 1/2 cup low-fat cottage cheese with ¼ cup fruit or ¼ cup salsa  - RTD Protein drinks: Atkins, Low Carb Slim Fast, EAS light, Muscle Milk Light, etc.  - Homemade Protein drinks: GNC Soy95, Isopure, Nectar, UNJURY, Whey Gourmet, etc. Mix 1 scoop powder with 8oz skim/1% milk or light soymilk.  - Protein bars: Atkins, EAS, Pure Protein, Think Thin, Detour, etc. Must have 0-4 grams sugar - Read the label.    Takeout Options: No more than twice/week  Deli - Salads (no pasta or rice), meats, cheeses. Roasted chicken. Lox (salmon)    Mexican - Platters which don't include tortillas, chips, or rice. Go easy on the beans. Example: Fajitas without the tortillas. Ask the  not to bring chips to the table if they are too tempting.    Greek - Meat or fish and vegetable, but no bread or rice. Including hummus, baba ganoush, etc, is OK. Most sit-down Greek restaurants can provide you with cucumber slices for dipping instead of avelino bread.    Fast Food (Avoid as much as possible) - Salads (no croutons and limit salad dressing to 2 tbsp), grilled chicken sandwich without the bun and ask for no cole. Rosanas low fat chili or  Taco Bell pintos and cheese.    BBQ - The meats are fine if you ask for sauces on the side, but most of the traditional side dishes are loaded with carbs. Wilfredo slaw, baked beans and BBQ sauce are typically made with sugar.    Chinese - Nothing deep-fried, no rice or noodles. Many Chinese sauces have starch and sugar in them, so you'll have to use your judgement. If you find that these sauces trigger cravings, or cause Dumping, you can ask for the sauce to be made without sugar or just use soy sauce.      Exercise should be some cardiovascular and some resistance training(see below).      STRENGTHENING  An adequate resistance training program could include the following types of exercise, focusing on the major muscle groups. One can use 5 to 10 pound dumbbells for those exercises that require the use of weight. At home, one can use a household item that provides a comfortable weight, such as a milk carton or beverage container. These exercises can also be performed without weights. Add some type of resistance training 2-3 days a week. These can be body weight exercises, light weight or elastic bands. [employer] and [employer] are great sources for free work out plans and videos.       Chest (Bench Press): Hold the weights with your hands. Lying on a flat surface, with knees bent and feet flat, slowly bring the weights to the chest area with palms facing upward. Begin to exhale and press the weights up, fully extending the arms, and keeping them above your eyes. Inhale as you lower them to the starting position and repeat the movement.  Back (Bent-Over Row): Start by placing your feet shoulder-width apart.  a weight with each hand just outside the knees. Keeping the back straight and the knees flexed, slightly bend forward at the waist. Let the arms hang naturally while holding the weights. From this starting position, pull the weights to the lower abdomen, keeping the elbows close to the body. Exhale as you pull.  Return to the starting position, inhaling as you go.  Arms (Bicep Curls): Hold a weight in each hand, with elbows at your sides, palms facing forward. The back should be straight, the chest flared outward. Begin to bend your right arm up first while exhaling, keeping the elbow totally stationary. Wait until the right arm goes completely down to the fully extended position, and then begin to curl the left arm. Each arm curled completes one full repetition.  Shoulders (Lateral Raises): Place the feet a few inches apart with the knees bent slightly. Keep the back erect as you lean forward slightly. With the weights in front of your thighs and palms facing together, begin to slowly raise them up to the side until parallel with the floor. Lower the weights to your thighs, and repeat.  Legs (Stiff Leg Dead Lift): Start by standing straight, holding the weights close to your sides, nearly touching your thighs. Keep the weights close to the body--this protects the back. The back must stay straight. Bend at the waist as far forward as you comfortably can while keeping your legs straight, and begin to feel the pull in your hamstrings as you lower the weights toward the ground. Slowly return to the starting position, keeping the back straight.  Legs (Dumbbell Lunge): Hold a weight in each hand, arms hanging at your sides. Step out with one leg, keeping the back straight. It is important to step out far enough so that the knee does not extend over the toe. This puts too much stress on the knee. Go down far enough so that the opposite knee nearly touches the ground. Keep this stance and repeat the lunging motion for several repetitions.  Abdominals: Do not perform standard sit-ups as these could hurt the lower back. Rather, focus on the following 2 exercises: (1) Obliques--Lie on your back with the knees flexed in the bent sit-up position. From this position, bring both knees down to the ground. With the back remaining flat,  "begin to flex your body toward your toes ("crunch"). Bring your shoulders up off the ground, but go slowly, controlling your momentum. Repeat this for 10 to 15 times. (2) Seated bench kicks/heber knives--Sit on the end of a bench or chair with the hands placed behind the buttocks. Begin to kick the legs outward with the knees bent slightly--at the same time, lean back to extend the torso. Come back to the beginning position and repeat this motion 10 to 15 times.    "

## 2023-12-04 ENCOUNTER — OFFICE VISIT (OUTPATIENT)
Dept: BARIATRICS | Facility: CLINIC | Age: 43
End: 2023-12-04
Payer: COMMERCIAL

## 2023-12-04 VITALS
OXYGEN SATURATION: 97 % | WEIGHT: 277.81 LBS | DIASTOLIC BLOOD PRESSURE: 83 MMHG | BODY MASS INDEX: 44.84 KG/M2 | SYSTOLIC BLOOD PRESSURE: 143 MMHG | HEART RATE: 75 BPM

## 2023-12-04 DIAGNOSIS — E66.01 CLASS 3 SEVERE OBESITY DUE TO EXCESS CALORIES WITH SERIOUS COMORBIDITY AND BODY MASS INDEX (BMI) OF 40.0 TO 44.9 IN ADULT: Primary | ICD-10-CM

## 2023-12-04 PROCEDURE — 99999 PR PBB SHADOW E&M-EST. PATIENT-LVL III: ICD-10-PCS | Mod: PBBFAC,,, | Performed by: INTERNAL MEDICINE

## 2023-12-04 PROCEDURE — 3079F DIAST BP 80-89 MM HG: CPT | Mod: CPTII,S$GLB,, | Performed by: INTERNAL MEDICINE

## 2023-12-04 PROCEDURE — 3044F HG A1C LEVEL LT 7.0%: CPT | Mod: CPTII,S$GLB,, | Performed by: INTERNAL MEDICINE

## 2023-12-04 PROCEDURE — 1160F PR REVIEW ALL MEDS BY PRESCRIBER/CLIN PHARMACIST DOCUMENTED: ICD-10-PCS | Mod: CPTII,S$GLB,, | Performed by: INTERNAL MEDICINE

## 2023-12-04 PROCEDURE — 3077F PR MOST RECENT SYSTOLIC BLOOD PRESSURE >= 140 MM HG: ICD-10-PCS | Mod: CPTII,S$GLB,, | Performed by: INTERNAL MEDICINE

## 2023-12-04 PROCEDURE — 3008F PR BODY MASS INDEX (BMI) DOCUMENTED: ICD-10-PCS | Mod: CPTII,S$GLB,, | Performed by: INTERNAL MEDICINE

## 2023-12-04 PROCEDURE — 1159F MED LIST DOCD IN RCRD: CPT | Mod: CPTII,S$GLB,, | Performed by: INTERNAL MEDICINE

## 2023-12-04 PROCEDURE — 99999 PR PBB SHADOW E&M-EST. PATIENT-LVL III: CPT | Mod: PBBFAC,,, | Performed by: INTERNAL MEDICINE

## 2023-12-04 PROCEDURE — 3077F SYST BP >= 140 MM HG: CPT | Mod: CPTII,S$GLB,, | Performed by: INTERNAL MEDICINE

## 2023-12-04 PROCEDURE — 99214 OFFICE O/P EST MOD 30 MIN: CPT | Mod: S$GLB,,, | Performed by: INTERNAL MEDICINE

## 2023-12-04 PROCEDURE — 99214 PR OFFICE/OUTPT VISIT, EST, LEVL IV, 30-39 MIN: ICD-10-PCS | Mod: S$GLB,,, | Performed by: INTERNAL MEDICINE

## 2023-12-04 PROCEDURE — 3079F PR MOST RECENT DIASTOLIC BLOOD PRESSURE 80-89 MM HG: ICD-10-PCS | Mod: CPTII,S$GLB,, | Performed by: INTERNAL MEDICINE

## 2023-12-04 PROCEDURE — 3008F BODY MASS INDEX DOCD: CPT | Mod: CPTII,S$GLB,, | Performed by: INTERNAL MEDICINE

## 2023-12-04 PROCEDURE — 3044F PR MOST RECENT HEMOGLOBIN A1C LEVEL <7.0%: ICD-10-PCS | Mod: CPTII,S$GLB,, | Performed by: INTERNAL MEDICINE

## 2023-12-04 PROCEDURE — 1159F PR MEDICATION LIST DOCUMENTED IN MEDICAL RECORD: ICD-10-PCS | Mod: CPTII,S$GLB,, | Performed by: INTERNAL MEDICINE

## 2023-12-04 PROCEDURE — 1160F RVW MEDS BY RX/DR IN RCRD: CPT | Mod: CPTII,S$GLB,, | Performed by: INTERNAL MEDICINE

## 2023-12-04 RX ORDER — PHENTERMINE HYDROCHLORIDE 37.5 MG/1
TABLET ORAL
Qty: 30 TABLET | Refills: 0 | Status: SHIPPED | OUTPATIENT
Start: 2023-12-04 | End: 2024-01-16

## 2023-12-04 NOTE — PATIENT INSTRUCTIONS
Patient warned of common side effects of phentermine including anxiety, insomnia, palpitations and increased blood pressure. It was also explained that it is for short-term usage along with diet and exercise, and that stopping the medication without making lifestyle changes will result in regain of weight. Patient states understanding.     Weight loss medications are controlled substances.  They require routine follow up. Prescription or pills that are lost or destroyed will not be replaced.     Start phentermine with 1/2 pill a day for at least 1-2 weeks to see if that will control your appetite.  Go up to a full pill when needed.     Let us know that end of your Rx which you'd prefer to continue, phentermine or diethylpropion.    Increase low impact activity as tolerated.  Avoid high impact activity, very heavy lifting or other exercise regimens that may cause discomfort.       Add some type of resistance training 2-3 days a week. These can be body weight exercises, light weight or elastic bands. [employer] and [employer] are great sources for free work out plans and videos.     1200 angella pb meal planner, meal ideas and exercise handouts given previously.      Helpful tips to survive the holidays:  Dont save yourself for the meal: Make sure you continue to eat high protein small meals every 3-4 hours to ensure to do not become over-hungry. Avoid temptation by not showing up to a holiday party or gathering hungry.   Plan ahead. Bring a dish to a party if you know there may not be an appropriate option.   Choose sugar-free drinks: Stick to water or other sugar-free beverages and make sure you are getting 6-8 cups of fluid each day (but not with meals!). Avoid alcohol, carbonated beverages, and high-fat/high-sugar beverages like hot chocolate and eggnog. Try sugar-free hot cocoa made with skim milk or water, or sugar-free spiced tea to add some holiday flair to your beverage (see sugar-free mulled cider recipe  below)  Take your time: Eat mindfully. Dont graze on food throughout the day. Sit down to enjoy your small meals. Chew slowly and thoroughly. Cut your food into small pieces before eating.  Listen to your body: Stop eating as soon as you feel full. Do not feel pressured to try certain (or all) foods or to eat all of the food on your plate. Listen to your hunger cues.   REMEMBER: Make your holidays about spending time with family and friends instead of focusing gatherings around food.  Keep up your exercise routine: Make sure you continue to get regular exercise throughout the holiday season. Encourage friends and family to be active by taking a walk together after a meal, to look at holiday lights, or to window-shop.    Good Holiday Meal Options:  Roasted Turkey, NO skin. Use low sodium broth instead of gravy.   Stuffed Bell Peppers made WITHOUT bread crumbs or Rice. Try using parmesan cheese instead  Gumbo, NO rice. Try picking out mostly the meat/seafood and vegetables with little broth.   Green Bean Casserole made with 98% fat free cream of mushroom soup and crushed almonds/pecans instead of fried onions  Side salad w/ low fat dressing. Try a different kind of salad maybe use Kale or spinach.   Roasted non-starchy vegetables like brussel sprouts, broccoli, green beans, zucchini, butternut squash, cauliflower  Cauliflower Mash (steam or roast cauliflower, puree w/ low fat cheese, dash of fat free milk and 2-3 sprays of I cant believe its not butter spray. Add garlic powder and black pepper to season). Use Low sodium broth instead of gravy.   Try Loaded Cauliflower Mash (Make cauliflower like above cauliflower mash. Top with diced turkey turpin, ¼ cup low fat cheddar cheese and bake @ 350* F for 5-10 minutes, until cheese is melted. Top with minced chives, black pepper and garlic to taste).   Homemade cranberry sauce using Splenda or another alternative sweetener. Boil fresh cranberries and add splenda to taste.  Boil until cranberries break open and then simmer until it reaches the consistency you want (less time for more watery sauce and simmer for longer to create a thicker sauce).   Deviled eggs: make using low fat cole, mustard, DILL relish (not sweet relish).   Vegetable tray w/ Greek yogurt Ranch Dip. Mix 1 packet of hidden [place] ranch dip mix w/ 16 oz low fat plain greek yogurt.     Good Holiday Dessert Options:  High protein Pumpkin Cheesecake (see recipe below)  Pumpkin Whip (see recipe below)  Quest Apple Pie or Cinnamon Roll flavored protein bar (warm in microwave for 10-15 seconds)  Eggnog Protein shake (see recipe below)  Fresh fruit w/ low fat cheese  Sugar-free Jello Parfaits. Layer Red and Green sugar-free jello in cups and top w/ 2 tbsp Sugar-free cool-whip    Pumpkin Cheesecake    8 ounces fat free cream cheese, softened   2 scoops of vanilla protein powder (<4 g sugar per serving)   ¼ tsp Fine salt   2 eggs, at room temperature   1/3 cup fat free sour cream  1/3 cup fat free half and half  1 15 -ounce can pure pumpkin puree   1 tablespoon pumpkin pie spice, plus more for dusting   Unsalted nuts, crushed  *Add splenda to taste    Directions     1. Preheat the oven to 300 degrees F. Line 18 muffin cups with paper liners. Sprinkle 1 tsp crushed unsalted nuts at the bottom of each of muffin cup liner.     2. In a large bowl, beat the cream cheese, vanilla protein powder and 1/4 teaspoon fine salt on medium-high speed until smooth and creamy, 2 to 3 minutes. Scrape down the sides, reduce speed to low and beat in the eggs, 1 at a time, until combined. Beat in 1/3 cup fat free sour cream and fat free half and half. Stir in the pumpkin puree and pumpkin pie spice until smooth. Divide evenly among cookie-lined paper cups, filling almost all the way to the top.     3. Bake until the filling is just set, 40 to 45 minutes. A sharp knife inserted into the center will come out moist, but clean. Cool completely in tins  on a wire rack. Refrigerate until cold, 4 hours, or overnight. Top with a dusting of pumpkin pie spice.    Recipe altered from the following recipe: http://www.[employer]omexcutive.SETVI/recipes/food-network-adelfo/mini-pumpkin-cheesecakes-recipe.print.html?oc=linkback    Pumpkin Whip    Box of sugar-free vanilla pudding  Can of pumpkin puree  Pumpkin Pie spice (sprinkle to taste)  ½ cup of sugar-free Cool Whip    Directions:  Make sugar-free pudding according to package directions using fat free or 1% milk. Stir in pumpkin and cool whip. Add pumpkin pie spice to taste.     Egg Nog Protein shake    8 oz skim or 1% milk  1 scoop vanilla protein powder  1 tbsp sugar-free vanilla pudding mix  ½ tsp butter flavor extract  ½ tsp rum extract  ½ tsp cinnamon     Shake together or blend with ice and serve.     Sugar-Free Mulled Cider    3 oz diet cran-apple juice  6 oz water  1 packet sugar-free apple cider mix  ½ tsp apple pie spice  ½ tsp butter flavor extract  1 tbsp Sugar-free Syrup    Mix together. Warm if needed and serve w/ orange wedge and cinnamon stick.

## 2023-12-04 NOTE — PROGRESS NOTES
Subjective     Patient ID: Anjelica Vizcaino is a 43 y.o. male.    Chief Complaint: Follow-up    CC:weight    Pt here today for follow-up. Has lost 10 lbs (-2 lbs muscle. -6.5 lbs fat). Was to start 1200 angella pb meal planner, exercise and started diethylpropion 11/10/23.   checked today. Denies SE.  Feels appetite is down some and getting full faster. Less snacking.   States he has been doing better with eating. Has stopped sugary drinks. Not eating late and having boiled eggs, fruit, salads, subway sandwich or baked pork chop and rice.   Exercise only 1-2 days.       New BMR: 1801    New PBF: 47.4%      Initial:  BMR: 1836    PBF:  48%      Review of Systems       Objective   BP (!) 143/83   Pulse 75   Wt 126 kg (277 lb 12.8 oz)   SpO2 97%   BMI 44.84 kg/m²     Physical Exam  Vitals reviewed.   Constitutional:       General: He is not in acute distress.     Appearance: He is well-developed.      Comments: With severe obesity     HENT:      Head: Normocephalic and atraumatic.   Eyes:      General: No scleral icterus.     Pupils: Pupils are equal, round, and reactive to light.   Cardiovascular:      Rate and Rhythm: Normal rate and regular rhythm.   Pulmonary:      Effort: Pulmonary effort is normal. No respiratory distress.   Musculoskeletal:         General: Normal range of motion.   Skin:     General: Skin is warm and dry.   Neurological:      Mental Status: He is alert and oriented to person, place, and time.   Psychiatric:         Behavior: Behavior normal.         Judgment: Judgment normal.            Assessment and Plan     1. Class 3 severe obesity due to excess calories with serious comorbidity and body mass index (BMI) of 40.0 to 44.9 in adult  -     phentermine (ADIPEX-P) 37.5 mg tablet; 1/2 -1 tab po daily  Dispense: 30 tablet; Refill: 0          Anjelica was seen today for follow-up.    Diagnoses and all orders for this visit:    Class 3 severe obesity due to excess calories with serious comorbidity  and body mass index (BMI) of 40.0 to 44.9 in adult  -     phentermine (ADIPEX-P) 37.5 mg tablet; 1/2 -1 tab po daily        Patient warned of common side effects of phentermine including anxiety, insomnia, palpitations and increased blood pressure. It was also explained that it is for short-term usage along with diet and exercise, and that stopping the medication without making lifestyle changes will result in regain of weight. Patient states understanding.     Weight loss medications are controlled substances.  They require routine follow up. Prescription or pills that are lost or destroyed will not be replaced.     Start phentermine with 1/2 pill a day for at least 1-2 weeks to see if that will control your appetite.  Go up to a full pill when needed.     Let us know that end of your Rx which you'd prefer to continue, phentermine or diethylpropion.    Increase low impact activity as tolerated.  Avoid high impact activity, very heavy lifting or other exercise regimens that may cause discomfort.       Add some type of resistance training 2-3 days a week. These can be body weight exercises, light weight or elastic bands. [employer] and [employer] are great sources for free work out plans and videos.     1200 angella pb meal planner, meal ideas and exercise handouts given previously.           No follow-ups on file.

## 2024-01-11 ENCOUNTER — TELEPHONE (OUTPATIENT)
Dept: BARIATRICS | Facility: CLINIC | Age: 44
End: 2024-01-11
Payer: COMMERCIAL

## 2024-01-11 NOTE — TELEPHONE ENCOUNTER
----- Message from Yazmin Coleman sent at 1/11/2024  1:21 PM CST -----  Regarding: Prescription Change  Contact: [phone]  Calling to request original prescription for weight loss, does not like second medication. Please call to confirm prescription has been sent to [facility].    [facility] #[id number] - [place] - [address]  [address] [place] [address]  Phone: [phone] Fax: [phone]

## 2024-01-12 ENCOUNTER — PATIENT MESSAGE (OUTPATIENT)
Dept: BARIATRICS | Facility: CLINIC | Age: 44
End: 2024-01-12
Payer: COMMERCIAL

## 2024-01-12 DIAGNOSIS — E66.01 CLASS 3 SEVERE OBESITY DUE TO EXCESS CALORIES WITH SERIOUS COMORBIDITY AND BODY MASS INDEX (BMI) OF 40.0 TO 44.9 IN ADULT: Primary | ICD-10-CM

## 2024-01-16 RX ORDER — DIETHYLPROPION HYDROCHLORIDE 75 MG/1
75 TABLET, EXTENDED RELEASE ORAL DAILY
Qty: 30 TABLET | Refills: 1 | Status: SHIPPED | OUTPATIENT
Start: 2024-01-16

## 2024-04-20 ENCOUNTER — TELEPHONE (OUTPATIENT)
Dept: INTERNAL MEDICINE | Facility: CLINIC | Age: 44
End: 2024-04-20
Payer: COMMERCIAL

## 2025-01-21 ENCOUNTER — TELEPHONE (OUTPATIENT)
Dept: BARIATRICS | Facility: CLINIC | Age: 45
End: 2025-01-21
Payer: COMMERCIAL

## 2025-01-21 NOTE — TELEPHONE ENCOUNTER
Pt called to reschedule [facility] med appt d/t inclement weather. No answer, LVM w/cb name and number. Will send portal message.

## 2025-01-21 NOTE — TELEPHONE ENCOUNTER
----- Message from Candice sent at 1/21/2025 11:22 AM CST -----  Regarding: Missed Call  Contact: NADIA COLEY [[phone]]  Returning a Missed Call    Caller: NADIA COLEY [[phone]    Returning call to :  Tory    Caller can be reached @:  [phone]    Nature of the call:  Missed Call - Please schedule pt for a virtual visit and call or message pt of appt date and time.

## 2025-01-28 ENCOUNTER — OFFICE VISIT (OUTPATIENT)
Dept: BARIATRICS | Facility: CLINIC | Age: 45
End: 2025-01-28
Payer: COMMERCIAL

## 2025-01-28 VITALS
OXYGEN SATURATION: 98 % | SYSTOLIC BLOOD PRESSURE: 134 MMHG | HEIGHT: 66 IN | HEART RATE: 70 BPM | WEIGHT: 293.13 LBS | BODY MASS INDEX: 47.11 KG/M2 | DIASTOLIC BLOOD PRESSURE: 67 MMHG

## 2025-01-28 DIAGNOSIS — E66.01 CLASS 3 SEVERE OBESITY DUE TO EXCESS CALORIES WITH SERIOUS COMORBIDITY AND BODY MASS INDEX (BMI) OF 40.0 TO 44.9 IN ADULT: ICD-10-CM

## 2025-01-28 DIAGNOSIS — E66.813 CLASS 3 SEVERE OBESITY DUE TO EXCESS CALORIES WITH SERIOUS COMORBIDITY AND BODY MASS INDEX (BMI) OF 40.0 TO 44.9 IN ADULT: ICD-10-CM

## 2025-01-28 PROCEDURE — 1160F RVW MEDS BY RX/DR IN RCRD: CPT | Mod: CPTII,S$GLB,, | Performed by: INTERNAL MEDICINE

## 2025-01-28 PROCEDURE — 1159F MED LIST DOCD IN RCRD: CPT | Mod: CPTII,S$GLB,, | Performed by: INTERNAL MEDICINE

## 2025-01-28 PROCEDURE — 3008F BODY MASS INDEX DOCD: CPT | Mod: CPTII,S$GLB,, | Performed by: INTERNAL MEDICINE

## 2025-01-28 PROCEDURE — 99999 PR PBB SHADOW E&M-EST. PATIENT-LVL IV: CPT | Mod: PBBFAC,,, | Performed by: INTERNAL MEDICINE

## 2025-01-28 PROCEDURE — 3078F DIAST BP <80 MM HG: CPT | Mod: CPTII,S$GLB,, | Performed by: INTERNAL MEDICINE

## 2025-01-28 PROCEDURE — 3075F SYST BP GE 130 - 139MM HG: CPT | Mod: CPTII,S$GLB,, | Performed by: INTERNAL MEDICINE

## 2025-01-28 PROCEDURE — 99212 OFFICE O/P EST SF 10 MIN: CPT | Mod: S$GLB,,, | Performed by: INTERNAL MEDICINE

## 2025-01-28 RX ORDER — DIETHYLPROPION HYDROCHLORIDE 75 MG/1
75 TABLET, EXTENDED RELEASE ORAL DAILY
Qty: 30 TABLET | Refills: 2 | Status: SHIPPED | OUTPATIENT
Start: 2025-01-28

## 2025-01-28 NOTE — PROGRESS NOTES
"Subjective     Patient ID: Anjelica Vizcaino is a 44 y.o. male.    Chief Complaint: Follow-up    CC:weight    Pt here today for follow-up. Last seen Dec 2023.  Has gained 5.7 lbs (-1.7 lbs muscle. +9.3lbs fat). Was to start 1200 angella pb meal planner, exercise and started diethylpropion 1/16/24.   checked today. Denies SE.   Eating states has been eating more fried foods.   Exercise- not lately.       New BMR: 1796  New PBF: 50.3%      Initial:  BMR: 1836  PBF:  48%      Review of Systems       Objective   /67   Pulse 70   Ht 5' 6" (1.676 m)   Wt 132.9 kg (293 lb 1.6 oz)   SpO2 98%   BMI 47.31 kg/m²     Physical Exam  Vitals reviewed.   Constitutional:       General: He is not in acute distress.     Appearance: He is well-developed.      Comments: With severe obesity     HENT:      Head: Normocephalic and atraumatic.   Eyes:      General: No scleral icterus.     Pupils: Pupils are equal, round, and reactive to light.   Cardiovascular:      Rate and Rhythm: Normal rate and regular rhythm.   Pulmonary:      Effort: Pulmonary effort is normal. No respiratory distress.   Musculoskeletal:         General: Normal range of motion.   Skin:     General: Skin is warm and dry.   Neurological:      Mental Status: He is alert and oriented to person, place, and time.   Psychiatric:         Behavior: Behavior normal.         Judgment: Judgment normal.            Assessment and Plan     1. Class 3 severe obesity due to excess calories with serious comorbidity and body mass index (BMI) of 40.0 to 44.9 in adult  -     diethylpropion (TENUATE) 75 mg SR tablet; Take 1 tablet (75 mg total) by mouth once daily.  Dispense: 30 tablet; Refill: 2            Anjelica was seen today for follow-up.    Diagnoses and all orders for this visit:    Class 3 severe obesity due to excess calories with serious comorbidity and body mass index (BMI) of 40.0 to 44.9 in adult  -     diethylpropion (TENUATE) 75 mg SR tablet; Take 1 tablet (75 mg " total) by mouth once daily.      Patient warned of common side effects of diethylpropion including anxiety, insomnia, palpitations and increased blood pressure. It was also explained that it is for short-term usage along with diet and exercise, and that stopping the medication without making lifestyle changes will result in regain of weight. Patient states understanding.    Weight loss medications are controlled substances.  They require routine follow up. Prescription or pills that are lost or destroyed will not be replaced.         Increase low impact activity as tolerated.  Avoid high impact activity, very heavy lifting or other exercise regimens that may cause discomfort.       Add some type of resistance training 2-3 days a week. These can be body weight exercises, light weight or elastic bands. [employer] and [employer] are great sources for free work out plans and videos.     1200 angella pb meal planner, meal ideas and exercise handouts given previously.      Check on your insurance's formulary (drug plan) web site to see if Wegovy (semaglutide 2.4 mg) is covered for obesity alone. This is not the same as coverage for heart attack, stroke or vascular disease with overweight/obesity. In that case you would need to have the history of one of those conditions for coverage.   You may also go to https://www.[employer].Assurz/wegovy/cost-navigator.html to check coverage, but that information may not be as accurate.   Check for coverage for Zepbound (terzepatide  5, 10 and 15 mg).  If Zepbound is covered for sleep apnea under your plan, we will also need documentation of that diagnosis and previous treatment for it, including a sleep study and/or notes from a sleep medicine doctor.  These particular name brands are important. They are not the same as the diabetes brands, and will not be covered the same.     Sample Weight Training Plan given       No follow-ups on file.

## 2025-01-28 NOTE — PATIENT INSTRUCTIONS
Patient warned of common side effects of diethylpropion including anxiety, insomnia, palpitations and increased blood pressure. It was also explained that it is for short-term usage along with diet and exercise, and that stopping the medication without making lifestyle changes will result in regain of weight. Patient states understanding.    Weight loss medications are controlled substances.  They require routine follow up. Prescription or pills that are lost or destroyed will not be replaced.         Increase low impact activity as tolerated.  Avoid high impact activity, very heavy lifting or other exercise regimens that may cause discomfort.       Add some type of resistance training 2-3 days a week. These can be body weight exercises, light weight or elastic bands. [employer] and [employer] are great sources for free work out plans and videos.     1200 angella pb meal planner, meal ideas and exercise handouts given previously.    ROSTR Saritha (code 23843) , [employer]      Check on your insurance's formulary (drug plan) web site to see if Wegovy (semaglutide 2.4 mg) is covered for obesity alone. This is not the same as coverage for heart attack, stroke or vascular disease with overweight/obesity. In that case you would need to have the history of one of those conditions for coverage.   You may also go to https://www.[employer]/wegovy/cost-navigator.html to check coverage, but that information may not be as accurate.   Check for coverage for Zepbound (terzepatide  5, 10 and 15 mg).  If Zepbound is covered for sleep apnea under your plan, we will also need documentation of that diagnosis and previous treatment for it, including a sleep study and/or notes from a sleep medicine doctor.  These particular name brands are important. They are not the same as the diabetes brands, and will not be covered the same.     Sample Weight Training Plan ( adapted from [facility]):    1.Goblet Squat  How to:    Stand with feet  hip-width apart and hold a weight vertically in front of chest, elbows pointing toward the floor.  Push hips back and bend knees to lower into a squat.  Drive through heels to stand back up to starting position. That's 1 rep. Complete three to five reps with a heavy weight.      2.Bent-Over Row  How to:    With a dumbbell in each hand and feet under hips, hinge at hips with knees slightly bent and arms just in front of legs.  Drive one elbow back toward hips, feeling shoulder blades squeeze together, pulling weight toward side body.  Slowly lower weight back down, then repeat with other arm. That's 1 rep. Complete three to five reps with a medium-heavy weight.        3.Lateral Lunge  How to:    Holding a weight at chest or dumbbells at each side, stand up straight with feet hip-width apart.  Take a large step to the right, sit hips back, and lower down until right knee is nearly parallel with the floor. Your left leg should be straight.  Return to start. That's 1 rep. Complete 10 reps on each side.      4.Chest Press  How to:    Lie flat on back, or on a bench, with feet flat on the ground. With a dumbbell in each hand, extend arms directly over shoulders, palms facing toward feet.  Squeeze shoulder blades together and slowly bend elbows, lowering the weights out to the side, parallel with shoulders, until elbows form 90-degree angles.  Slowly drive the dumbbells back up to start, squeezing shoulder blades the entire time. Thats 1 rep. Complete three to five reps with a medium-heavy weight.      5.Split Stance Shoulder Press    How to:    Grab a pair of dumbbells or a resistance band. Stagger stance into a wide step, one foot forward and one back with hips squared, and hold the weights or band just above shoulders, elbows close to sides.  Leaning forward ever so slightly, bend both knees, and press through front heel while simultaneously lifting the weights or band to the toby, keeping elbows forward and arms in  line with your ears.  Lower weights or band back to shoulders. That's 1 rep. Do 10 reps, alternating side for each set.        6.Alternating Reverse Lunge To Bicep Curl  How to:    Grab a pair of dumbbells and hold them at arm's length next to sides, palms facing each other. Stand tall with feet hip-width apart.  Step backward with right leg and lower body until front knee is bent 90 degrees. At the same time as you lunge, curl both dumbbells up to shoulders.  Lower the dumbbells as you return to the starting position. Step back with the other leg and repeat.That's 1 rep. Complete 12 reps with a medium weight.

## 2025-02-28 DIAGNOSIS — E66.01 CLASS 3 SEVERE OBESITY DUE TO EXCESS CALORIES WITH SERIOUS COMORBIDITY AND BODY MASS INDEX (BMI) OF 40.0 TO 44.9 IN ADULT: ICD-10-CM

## 2025-02-28 DIAGNOSIS — E66.813 CLASS 3 SEVERE OBESITY DUE TO EXCESS CALORIES WITH SERIOUS COMORBIDITY AND BODY MASS INDEX (BMI) OF 40.0 TO 44.9 IN ADULT: ICD-10-CM

## 2025-02-28 RX ORDER — DIETHYLPROPION HYDROCHLORIDE 75 MG/1
75 TABLET, EXTENDED RELEASE ORAL DAILY
Qty: 30 TABLET | Refills: 2 | Status: CANCELLED | OUTPATIENT
Start: 2025-02-28
